# Patient Record
Sex: FEMALE | Race: BLACK OR AFRICAN AMERICAN | NOT HISPANIC OR LATINO | Employment: UNEMPLOYED | URBAN - METROPOLITAN AREA
[De-identification: names, ages, dates, MRNs, and addresses within clinical notes are randomized per-mention and may not be internally consistent; named-entity substitution may affect disease eponyms.]

---

## 2022-07-13 RX ORDER — LANCETS 33 GAUGE
EACH MISCELLANEOUS
Qty: 100 EACH | Refills: 4 | Status: SHIPPED | OUTPATIENT
Start: 2022-07-13

## 2022-07-13 RX ORDER — BLOOD-GLUCOSE METER
EACH MISCELLANEOUS
Qty: 100 STRIP | Refills: 6 | Status: SHIPPED | OUTPATIENT
Start: 2022-07-13 | End: 2023-12-09

## 2024-01-05 RX ORDER — TIZANIDINE 4 MG/1
TABLET ORAL
Qty: 30 TABLET | Refills: 0 | Status: SHIPPED | OUTPATIENT
Start: 2024-01-05 | End: 2024-01-22

## 2024-01-05 NOTE — TELEPHONE ENCOUNTER
----- Message from Caridad Padilla sent at 1/5/2024  9:40 AM CST -----  Type:  RX Refill Request    Who Called: pt   Refill or New Rx:refill  RX Name and Strength:tiZANidine (ZANAFLEX) 4 MG tablet    Preferred Pharmacy with phone number:[id number]  [place] [address]  Local or Mail Order:local  Ordering Provider:Ludy Le FNP  Would the patient rather a call back or a response via [facility]? Call back  Best Call Back Number:[phone]

## 2024-01-05 NOTE — TELEPHONE ENCOUNTER
Pt requesting refill for tiZANidine (ZANAFLEX) 4 MG tablet           LOV: 11/8/23      NOV: 1/8/24

## 2024-01-08 ENCOUNTER — TELEPHONE (OUTPATIENT)
Dept: INTERNAL MEDICINE | Facility: CLINIC | Age: 69
End: 2024-01-08
Payer: MEDICARE

## 2024-01-08 NOTE — TELEPHONE ENCOUNTER
----- Message from Ge Mahmood sent at 1/2/2024 10:57 AM CST -----  .Type:  RX Refill Request    Who Called: pt  Refill or New Rx:Refill  RX Name and Strength:tiZANidine (ZANAFLEX) 4 MG tablet  How is the patient currently taking it? (ex. 1XDay):Take 0.5 tablets (2 mg total) by mouth nightly. - Oral  Is this a 30 day or 90 day RX:30 day  Preferred Pharmacy with phone number:[id number] [address] [place] - [facility]  Local or Mail Order:local  Ordering Provider:  Would the patient rather a call back or a response via [facility]? Call back  Best Call Back Number:  Additional Information:

## 2024-01-19 ENCOUNTER — LAB VISIT (OUTPATIENT)
Dept: LAB | Facility: HOSPITAL | Age: 69
End: 2024-01-19
Attending: NURSE PRACTITIONER
Payer: MEDICARE

## 2024-01-19 DIAGNOSIS — R73.03 PREDIABETES: ICD-10-CM

## 2024-01-19 DIAGNOSIS — E55.9 VITAMIN D DEFICIENCY: ICD-10-CM

## 2024-01-19 DIAGNOSIS — I10 HYPERTENSION, UNSPECIFIED TYPE: ICD-10-CM

## 2024-01-19 DIAGNOSIS — E78.5 HYPERLIPIDEMIA, UNSPECIFIED HYPERLIPIDEMIA TYPE: ICD-10-CM

## 2024-01-19 LAB
ALBUMIN SERPL-MCNC: 4.1 G/DL (ref 3.4–4.8)
ALBUMIN/GLOB SERPL: 1.2 RATIO (ref 1.1–2)
ALP SERPL-CCNC: 100 UNIT/L (ref 40–150)
ALT SERPL-CCNC: 19 UNIT/L (ref 0–55)
AST SERPL-CCNC: 16 UNIT/L (ref 5–34)
BASOPHILS # BLD AUTO: 0.04 X10(3)/MCL
BASOPHILS NFR BLD AUTO: 0.5 %
BILIRUB SERPL-MCNC: 0.2 MG/DL
BUN SERPL-MCNC: 12.7 MG/DL (ref 9.8–20.1)
CALCIUM SERPL-MCNC: 9.5 MG/DL (ref 8.4–10.2)
CHLORIDE SERPL-SCNC: 110 MMOL/L (ref 98–107)
CHOLEST SERPL-MCNC: 220 MG/DL
CHOLEST/HDLC SERPL: 4 {RATIO} (ref 0–5)
CO2 SERPL-SCNC: 24 MMOL/L (ref 23–31)
CREAT SERPL-MCNC: 0.66 MG/DL (ref 0.55–1.02)
DEPRECATED CALCIDIOL+CALCIFEROL SERPL-MC: 36.5 NG/ML (ref 30–80)
EOSINOPHIL # BLD AUTO: 0.12 X10(3)/MCL (ref 0–0.9)
EOSINOPHIL NFR BLD AUTO: 1.6 %
ERYTHROCYTE [DISTWIDTH] IN BLOOD BY AUTOMATED COUNT: 14.5 % (ref 11.5–17)
EST. AVERAGE GLUCOSE BLD GHB EST-MCNC: 108.3 MG/DL
GFR SERPLBLD CREATININE-BSD FMLA CKD-EPI: >60 MLS/MIN/1.73/M2
GLOBULIN SER-MCNC: 3.5 GM/DL (ref 2.4–3.5)
GLUCOSE SERPL-MCNC: 105 MG/DL (ref 82–115)
HBA1C MFR BLD: 5.4 %
HCT VFR BLD AUTO: 38.8 % (ref 37–47)
HDLC SERPL-MCNC: 51 MG/DL (ref 35–60)
HGB BLD-MCNC: 13.7 G/DL (ref 12–16)
IMM GRANULOCYTES # BLD AUTO: 0.02 X10(3)/MCL (ref 0–0.04)
IMM GRANULOCYTES NFR BLD AUTO: 0.3 %
LDLC SERPL CALC-MCNC: 127 MG/DL (ref 50–140)
LYMPHOCYTES # BLD AUTO: 2.44 X10(3)/MCL (ref 0.6–4.6)
LYMPHOCYTES NFR BLD AUTO: 32.1 %
MCH RBC QN AUTO: 26.1 PG (ref 27–31)
MCHC RBC AUTO-ENTMCNC: 35.3 G/DL (ref 33–36)
MCV RBC AUTO: 74 FL (ref 80–94)
MONOCYTES # BLD AUTO: 0.47 X10(3)/MCL (ref 0.1–1.3)
MONOCYTES NFR BLD AUTO: 6.2 %
NEUTROPHILS # BLD AUTO: 4.52 X10(3)/MCL (ref 2.1–9.2)
NEUTROPHILS NFR BLD AUTO: 59.3 %
NRBC BLD AUTO-RTO: 0 %
PLATELET # BLD AUTO: 302 X10(3)/MCL (ref 130–400)
PMV BLD AUTO: 11 FL (ref 7.4–10.4)
POTASSIUM SERPL-SCNC: 3.8 MMOL/L (ref 3.5–5.1)
PROT SERPL-MCNC: 7.6 GM/DL (ref 5.8–7.6)
RBC # BLD AUTO: 5.24 X10(6)/MCL (ref 4.2–5.4)
SODIUM SERPL-SCNC: 144 MMOL/L (ref 136–145)
TRIGL SERPL-MCNC: 210 MG/DL (ref 37–140)
VLDLC SERPL CALC-MCNC: 42 MG/DL
WBC # SPEC AUTO: 7.61 X10(3)/MCL (ref 4.5–11.5)

## 2024-01-19 PROCEDURE — 85025 COMPLETE CBC W/AUTO DIFF WBC: CPT

## 2024-01-19 PROCEDURE — 36415 COLL VENOUS BLD VENIPUNCTURE: CPT

## 2024-01-19 PROCEDURE — 83036 HEMOGLOBIN GLYCOSYLATED A1C: CPT

## 2024-01-19 PROCEDURE — 80053 COMPREHEN METABOLIC PANEL: CPT

## 2024-01-19 PROCEDURE — 82306 VITAMIN D 25 HYDROXY: CPT

## 2024-01-19 PROCEDURE — 80061 LIPID PANEL: CPT

## 2024-01-22 ENCOUNTER — OFFICE VISIT (OUTPATIENT)
Dept: INTERNAL MEDICINE | Facility: CLINIC | Age: 69
End: 2024-01-22
Payer: MEDICARE

## 2024-01-22 VITALS
RESPIRATION RATE: 18 BRPM | WEIGHT: 164 LBS | SYSTOLIC BLOOD PRESSURE: 122 MMHG | BODY MASS INDEX: 30.18 KG/M2 | DIASTOLIC BLOOD PRESSURE: 67 MMHG | HEIGHT: 62 IN | TEMPERATURE: 98 F | HEART RATE: 66 BPM

## 2024-01-22 DIAGNOSIS — R73.03 PREDIABETES: ICD-10-CM

## 2024-01-22 DIAGNOSIS — E55.9 VITAMIN D DEFICIENCY: ICD-10-CM

## 2024-01-22 DIAGNOSIS — Z12.11 COLON CANCER SCREENING: ICD-10-CM

## 2024-01-22 DIAGNOSIS — E78.5 HYPERLIPIDEMIA, UNSPECIFIED HYPERLIPIDEMIA TYPE: ICD-10-CM

## 2024-01-22 DIAGNOSIS — I10 HYPERTENSION, UNSPECIFIED TYPE: ICD-10-CM

## 2024-01-22 PROCEDURE — 99213 OFFICE O/P EST LOW 20 MIN: CPT | Mod: PBBFAC | Performed by: NURSE PRACTITIONER

## 2024-01-22 PROCEDURE — 99214 OFFICE O/P EST MOD 30 MIN: CPT | Mod: S$PBB,,, | Performed by: NURSE PRACTITIONER

## 2024-01-22 PROCEDURE — 3074F SYST BP LT 130 MM HG: CPT | Mod: CPTII,,, | Performed by: NURSE PRACTITIONER

## 2024-01-22 PROCEDURE — 1159F MED LIST DOCD IN RCRD: CPT | Mod: CPTII,,, | Performed by: NURSE PRACTITIONER

## 2024-01-22 PROCEDURE — 3008F BODY MASS INDEX DOCD: CPT | Mod: CPTII,,, | Performed by: NURSE PRACTITIONER

## 2024-01-22 PROCEDURE — 1160F RVW MEDS BY RX/DR IN RCRD: CPT | Mod: CPTII,,, | Performed by: NURSE PRACTITIONER

## 2024-01-22 PROCEDURE — 1126F AMNT PAIN NOTED NONE PRSNT: CPT | Mod: CPTII,,, | Performed by: NURSE PRACTITIONER

## 2024-01-22 PROCEDURE — 3044F HG A1C LEVEL LT 7.0%: CPT | Mod: CPTII,,, | Performed by: NURSE PRACTITIONER

## 2024-01-22 PROCEDURE — 3078F DIAST BP <80 MM HG: CPT | Mod: CPTII,,, | Performed by: NURSE PRACTITIONER

## 2024-01-22 PROCEDURE — 1101F PT FALLS ASSESS-DOCD LE1/YR: CPT | Mod: CPTII,,, | Performed by: NURSE PRACTITIONER

## 2024-01-22 PROCEDURE — 3288F FALL RISK ASSESSMENT DOCD: CPT | Mod: CPTII,,, | Performed by: NURSE PRACTITIONER

## 2024-01-22 RX ORDER — MELOXICAM 7.5 MG/1
7.5 TABLET ORAL DAILY
Qty: 90 TABLET | Refills: 1 | Status: SHIPPED | OUTPATIENT
Start: 2024-01-22 | End: 2024-05-23 | Stop reason: SDUPTHER

## 2024-01-22 RX ORDER — HYDROCHLOROTHIAZIDE 25 MG/1
25 TABLET ORAL DAILY
Qty: 90 TABLET | Refills: 1 | Status: SHIPPED | OUTPATIENT
Start: 2024-01-22 | End: 2024-05-23 | Stop reason: SDUPTHER

## 2024-01-22 RX ORDER — EZETIMIBE 10 MG/1
10 TABLET ORAL DAILY
Qty: 90 TABLET | Refills: 1 | Status: SHIPPED | OUTPATIENT
Start: 2024-01-22 | End: 2024-05-23 | Stop reason: SDUPTHER

## 2024-01-22 RX ORDER — AMLODIPINE BESYLATE 10 MG/1
10 TABLET ORAL DAILY
Qty: 90 TABLET | Refills: 1 | Status: SHIPPED | OUTPATIENT
Start: 2024-01-22 | End: 2024-05-23 | Stop reason: SDUPTHER

## 2024-01-22 RX ORDER — TIZANIDINE 4 MG/1
4 TABLET ORAL NIGHTLY PRN
Qty: 30 TABLET | Refills: 3 | Status: SHIPPED | OUTPATIENT
Start: 2024-01-22 | End: 2024-05-23 | Stop reason: SDUPTHER

## 2024-01-22 RX ORDER — LEVOCETIRIZINE DIHYDROCHLORIDE 5 MG/1
5 TABLET, FILM COATED ORAL NIGHTLY
Qty: 90 TABLET | Refills: 1 | Status: SHIPPED | OUTPATIENT
Start: 2024-01-22 | End: 2024-05-23 | Stop reason: SDUPTHER

## 2024-01-22 RX ORDER — MUPIROCIN 20 MG/G
OINTMENT TOPICAL 2 TIMES DAILY
Qty: 30 G | Refills: 1 | Status: SHIPPED | OUTPATIENT
Start: 2024-01-22

## 2024-01-22 RX ORDER — CLOTRIMAZOLE AND BETAMETHASONE DIPROPIONATE 10; .64 MG/G; MG/G
CREAM TOPICAL 2 TIMES DAILY
Qty: 45 G | Refills: 1 | Status: SHIPPED | OUTPATIENT
Start: 2024-01-22

## 2024-01-22 RX ORDER — POTASSIUM CHLORIDE 20 MEQ/15ML
20 SOLUTION ORAL 2 TIMES DAILY
Qty: 900 ML | Refills: 6 | Status: SHIPPED | OUTPATIENT
Start: 2024-01-22 | End: 2024-05-23

## 2024-01-22 RX ORDER — PREGABALIN 150 MG/1
150 CAPSULE ORAL 2 TIMES DAILY
COMMUNITY
Start: 2023-11-27

## 2024-01-22 NOTE — PROGRESS NOTES
[facility]  KARY Merrill     Patient Name: Lucila Sharma   : 1955  MRN:[id number]     Chief Complaint     Chief Complaint   Patient presents with    Follow-up     Lab review        History of Present Illness     68 year old AAF, presents in clinic for lab f/u. No acute complaints.      She was following Dr. Sarmad solis for chronic neck and back pain, states he is retiring. Will manage tizanidine 4mg qhs and mobic 7.5 daily. She is aware I can not prescribe Lyrica. Request refill for cream for a itchy rash to her back.     PMH HTN, HLD, prediabetes, hypokalemia, AR, vit d def, right thyroid nodule, osteopenia. Pt is on Norvasc 5 qd, and HCTZ 25mg qd. Takes KDUR 20 bid. Pt is followed by mental health Dr. Zuleta, once every 3 months.  Patient is currently not in any counseling.  Following ENT; US guided thyroid nodule biopsy 20; benign; annual thyroid ultrasound ordered. TFT normal. Tells me she stopped her statin, Lipitor, several weeks ago due to leg pain/aches. States within 5 days of stopping statin the pain went away. Denies chest pain, shortness of breath, cough, fever, headache, dizziness, weakness, abdominal pain, nausea, vomiting, diarrhea, constipation, dysuria, depression, SI, HI, hallucinations.     MMG 3/28/2023; BIRADS 1  Cologuard ordered but sample collected could not be processed x2. Will order FIT. She is not ready to have colonoscopy performed.      Thyroid US: 10/11/2022;  Stable multinodular goiter; ENT clinic advised to repeat US in 2 yrs (due 10/2024)               Review of Systems     Review of Systems   Constitutional: Negative.    HENT: Negative.     Eyes: Negative.    Respiratory: Negative.     Cardiovascular: Negative.    Gastrointestinal: Negative.    Endocrine: Negative.    Genitourinary: Negative.    Musculoskeletal: Negative.    Integumentary:  Negative.   Allergic/Immunologic: Negative.    Neurological: Negative.    Hematological: Negative.   "  Psychiatric/Behavioral: Negative.     All other systems reviewed and are negative.       Physical Examination     Visit Vitals  /67 (BP Location: Left arm, Patient Position: Sitting, BP Method: Medium (Automatic))   Pulse 66   Temp 97.8 °F (36.6 °C) (Oral)   Resp 18   Ht 5' 2" (1.575 m)   Wt 74.4 kg (164 lb)   BMI 30.00 kg/m²        BP Readings from Last 6 Encounters:   01/22/24 122/67   10/02/23 126/70   09/11/23 122/67   09/06/23 (!) 157/100   09/03/23 (!) 141/89   08/24/23 (!) 148/70   ]    Wt Readings from Last 6 Encounters:   01/22/24 74.4 kg (164 lb)   10/02/23 72.6 kg (160 lb)   09/11/23 72.1 kg (159 lb)   09/06/23 73.5 kg (162 lb)   09/02/23 73.6 kg (162 lb 5.9 oz)   08/24/23 72.1 kg (159 lb)   ]      Physical Exam  Vitals and nursing note reviewed.   Constitutional:       Appearance: Normal appearance.   HENT:      Head: Normocephalic and atraumatic.      Right Ear: Tympanic membrane, ear canal and external ear normal.      Left Ear: Tympanic membrane, ear canal and external ear normal.      Nose: Nose normal.      Mouth/Throat:      Mouth: Mucous membranes are moist.      Pharynx: Oropharynx is clear.   Eyes:      Extraocular Movements: Extraocular movements intact.      Conjunctiva/sclera: Conjunctivae normal.      Pupils: Pupils are equal, round, and reactive to light.   Cardiovascular:      Rate and Rhythm: Normal rate and regular rhythm.      Pulses: Normal pulses.      Heart sounds: Normal heart sounds.   Pulmonary:      Effort: Pulmonary effort is normal.      Breath sounds: Normal breath sounds.   Abdominal:      General: Abdomen is flat. Bowel sounds are normal.      Palpations: Abdomen is soft.   Musculoskeletal:         General: Normal range of motion.      Cervical back: Normal range of motion and neck supple.   Skin:     General: Skin is warm and dry.      Capillary Refill: Capillary refill takes less than 2 seconds.   Neurological:      General: No focal deficit present.      Mental " Status: She is alert and oriented to person, place, and time. Mental status is at baseline.   Psychiatric:         Mood and Affect: Mood normal.         Behavior: Behavior normal.         Thought Content: Thought content normal.         Judgment: Judgment normal.          Labs / Imaging     Chemistry:  Lab Results   Component Value Date     01/19/2024    K 3.8 01/19/2024    CHLORIDE 110 (H) 01/19/2024    BUN 12.7 01/19/2024    CREATININE 0.66 01/19/2024    EGFRNORACEVR >60 01/19/2024    GLUCOSE 105 01/19/2024    CALCIUM 9.5 01/19/2024    ALKPHOS 100 01/19/2024    LABPROT 7.6 01/19/2024    ALBUMIN 4.1 01/19/2024    BILIDIR 0.2 03/15/2022    IBILI 0.30 03/15/2022    AST 16 01/19/2024    ALT 19 01/19/2024    MG 1.90 09/06/2023    RDONQEIM00AN 36.5 01/19/2024        Lab Results   Component Value Date    HGBA1C 5.4 01/19/2024        Hematology:  Lab Results   Component Value Date    WBC 7.61 01/19/2024    RBC 5.24 01/19/2024    HGB 13.7 01/19/2024    HCT 38.8 01/19/2024    MCV 74.0 (L) 01/19/2024    MCH 26.1 (L) 01/19/2024    MCHC 35.3 01/19/2024    RDW 14.5 01/19/2024     01/19/2024    MPV 11.0 (H) 01/19/2024        Lipid Panel:  Lab Results   Component Value Date    CHOL 220 (H) 01/19/2024    HDL 51 01/19/2024    .00 01/19/2024    TRIG 210 (H) 01/19/2024    TOTALCHOLEST 4 01/19/2024        Urine:  Lab Results   Component Value Date    COLORUA Light-Yellow 01/19/2024    APPEARANCEUA Clear 01/19/2024    SGUA 1.018 01/19/2024    PHUA 5.0 01/19/2024    PROTEINUA Negative 01/19/2024    GLUCOSEUA Normal 01/19/2024    KETONESUA 1+ (A) 01/19/2024    BLOODUA Negative 01/19/2024    NITRITESUA Negative 01/19/2024    LEUKOCYTESUR Negative 01/19/2024    RBCUA 0-5 01/19/2024    WBCUA 0-5 01/19/2024    BACTERIA None Seen 01/19/2024    SQEPUA Trace (A) 01/19/2024    HYALINECASTS None Seen 01/19/2024          Assessment       ICD-10-CM ICD-9-CM   1. Hypertension, unspecified type  I10 401.9   2. Hyperlipidemia,  unspecified hyperlipidemia type  E78.5 272.4   3. Prediabetes  R73.03 790.29   4. Vitamin D deficiency  E55.9 268.9   5. Colon cancer screening  Z12.11 V76.51   6. BMI 30.0-30.9,adult  Z68.30 V85.30        Plan   1. Hypertension, unspecified type  BP and HR stable. Med refills. DASH diet: Eat more fruits, vegetables, and low fat dairy foods.  (Less than 2 grams of sodium per day).  Maintain healthy weight with goal BMI <30.   Exercise 30 minutes per day 5 days per week.  Home medications refilled and continued.   Home BP monitoring encouraged with BP parameters given.    - CBC Auto Differential; Future  - Comprehensive Metabolic Panel; Future  - Lipid Panel; Future  - Hemoglobin A1C; Future    2. Hyperlipidemia, unspecified hyperlipidemia type  Lab Results   Component Value Date    .00 01/19/2024    CHOL 220 (H) 01/19/2024    HDL 51 01/19/2024    TRIG 210 (H) 01/19/2024       Cont RX daily; refills given. Take Omega 3 daily.   Stressed importance of dietary modifications. Follow a low cholesterol, low saturated fat diet with less that 200mg of cholesterol a day.  Avoid fried foods and high saturated fats (high saturated fats less than 7% of calories).  Add Flax Seed/Fish Oil supplements to diet. Increase dietary fiber.  Regular exercise can reduce LDL and raise HDL. Stressed importance of physical activity 5 times per week for 30 minutes per day.    - CBC Auto Differential; Future  - Comprehensive Metabolic Panel; Future  - Lipid Panel; Future  - Hemoglobin A1C; Future    3. Prediabetes  A1C 5.4  Prediabetes is reversible. Close follow up is important.  Maintain healthy weight or lose weight.   Eat fewer refined carbohydrates and fats, and more fiber.  Read nutrition labels.  Reduce portion sizes.  Eat out less often. Avoid fast foods.  Drink water and unsweetened beverages.  Spending at least one hour every day in physical activity.   - CBC Auto Differential; Future  - Comprehensive Metabolic Panel;  Future  - Lipid Panel; Future  - Hemoglobin A1C; Future    4. Vitamin D deficiency  Vitamin D level reviewed and is currently at goal, between 30-80 ng/mL. Continue OTC Vitamin D3 2000 IU daily.   - Vitamin D; Future    5. Colon cancer screening    - OCCULT BLOOD FECAL IMMUNOASSAY; Future  - OCCULT BLOOD FECAL IMMUNOASSAY    6. BMI 30.0-30.9,adult  Goal BMI <30.  Exercise 5 times a week for 30 minutes per day.  Avoid soda, simple sugars, excessive rice, potatoes or bread. Limit fast foods and fried foods.  Choose complex carbs in moderation (example: green vegetables, beans, oatmeal). Eat plenty of fresh fruits and vegetables with lean meats daily.  Do not skip meals. Eat a balanced portion size.  Avoid fad diets. Consider permanent healthy life style changes.           Current Outpatient Medications   Medication Instructions    amLODIPine (NORVASC) 10 mg, Oral, Daily    azelastine (ASTELIN) 137 mcg, Nasal, 2 times daily    blood sugar diagnostic (ONETOUCH VERIO TEST STRIPS) Strp 1 each, Misc.(Non-Drug; Combo Route), Daily, DX: E11.9 Use daily to check CBG    blood sugar diagnostic Strp 1 each, Misc.(Non-Drug; Combo Route), Daily, TRUE METRIX    blood sugar diagnostic Strp One touch Verio  test strips, See Instructions, Use dailyto check glucose  DX :DM ,code:E11.9, # 100 EA, 0 Refill(s), Pharmacy: [id number], 155, cm, Height/Length Dosing, 10/20/21 15:35:00 CDT, 73, kg, Weight Dosing, 10/20/21 15:35:00 CDT    clotrimazole-betamethasone 1-0.05% (LOTRISONE) cream Topical (Top), 2 times daily PRN    clotrimazole-betamethasone 1-0.05% (LOTRISONE) cream Topical (Top), 2 times daily    EScitalopram oxalate (LEXAPRO) 20 mg, Oral, Daily    ezetimibe (ZETIA) 10 mg, Oral, Daily    fluticasone propionate (FLONASE) 50 mcg, Each Nostril, Daily    hydroCHLOROthiazide (HYDRODIURIL) 25 mg, Oral, Daily    hydrOXYzine HCL (ATARAX) 25 mg, Oral, 3 times daily PRN    lancets (ONETOUCH DELICA PLUS LANCET) 33 gauge Misc 1  lancet , Misc.(Non-Drug; Combo Route), Daily    lancets 33 gauge Misc Misc.(Non-Drug; Combo Route)    lancets Misc   One Touch Delica Lancets 33g, See Instructions, Use daily to check glucose  Dx DM: code:E11.9, # 100 EA, 0 Refill(s), Pharmacy: [id number], 155, cm, Height/Length Dosing, 10/20/21 15:35:00 CDT, 73, kg, Weight Dosing, 10/20/21 15:35:00 CDT    LATUDA 40 mg Tab tablet 1 tablet, Oral, Daily    levocetirizine (XYZAL) 5 mg, Oral, Nightly    meloxicam (MOBIC) 7.5 mg, Oral, Daily    mirtazapine (REMERON) 30 mg, Oral, Nightly    mupirocin (BACTROBAN) 2 % ointment Topical (Top), 2 times daily    ONETOUCH DELICA PLUS LANCET 33 gauge Misc USE 1 LANCET TO CHECK GLUCOSE ONCE DAILY    potassium chloride 10% (KAYCIEL) 20 mEq/15 mL oral solution 20 mEq, Oral, 2 times daily    prazosin (MINIPRESS) 5 mg, Oral, Nightly    pregabalin (LYRICA) 150 mg, Oral, 2 times daily    pregabalin (LYRICA) 150 mg, Oral, 2 times daily    tiZANidine (ZANAFLEX) 4 mg, Oral, Nightly PRN    traMADoL (ULTRAM) 50 mg, Oral, 2 times daily PRN    traZODone (DESYREL) 100 mg, Oral, Nightly    triamcinolone acetonide 0.1% (KENALOG) 0.1 % cream Topical (Top), 3 times daily PRN    triamcinolone acetonide 0.1% (KENALOG) 0.1 % ointment   See Instructions, TOP TID to affected area prn rash, # 60 gm, 1 Refill(s), Pharmacy: [id number], 155, cm, Height/Length Dosing, 10/20/21 13:35:00 CDT, 73.3, kg, Weight Dosing, 10/20/21 13:35:00 CDT       Orders Placed This Encounter   Procedures    OCCULT BLOOD FECAL IMMUNOASSAY    CBC Auto Differential    Comprehensive Metabolic Panel    Lipid Panel    Hemoglobin A1C    Vitamin D         Future Appointments   Date Time Provider Department Center   5/23/2024  1:00 PM Ludy Le, FNP [facility]   5/30/2024  1:30 PM Janey Prather, ANP [facility]        Follow up in about 4 months (around 5/22/2024) for lab review.    Labs thoroughly reviewed with patient. Medication  refills addressed today.  RTC prn and 4 months, with labs 1 week prior to the apt.  COVID 19 precautions given to patient.  Patient voices understanding of all discharge instructions.      KARY Merrill

## 2024-04-04 ENCOUNTER — HOSPITAL ENCOUNTER (OUTPATIENT)
Dept: RADIOLOGY | Facility: HOSPITAL | Age: 69
Discharge: HOME | End: 2024-04-04
Attending: NURSE PRACTITIONER
Payer: MEDICARE

## 2024-04-04 DIAGNOSIS — Z12.31 BREAST CANCER SCREENING BY MAMMOGRAM: ICD-10-CM

## 2024-04-04 PROCEDURE — 77063 BREAST TOMOSYNTHESIS BI: CPT | Mod: 26,,, | Performed by: RADIOLOGY

## 2024-04-04 PROCEDURE — 77067 SCR MAMMO BI INCL CAD: CPT | Mod: 26,,, | Performed by: RADIOLOGY

## 2024-04-04 PROCEDURE — 77067 SCR MAMMO BI INCL CAD: CPT | Mod: TC

## 2024-05-23 ENCOUNTER — OFFICE VISIT (OUTPATIENT)
Dept: INTERNAL MEDICINE | Facility: CLINIC | Age: 69
End: 2024-05-23
Payer: MEDICARE

## 2024-05-23 ENCOUNTER — HOSPITAL ENCOUNTER (OUTPATIENT)
Dept: RADIOLOGY | Facility: HOSPITAL | Age: 69
Discharge: HOME | End: 2024-05-23
Attending: NURSE PRACTITIONER
Payer: MEDICARE

## 2024-05-23 VITALS
DIASTOLIC BLOOD PRESSURE: 60 MMHG | HEIGHT: 62 IN | HEART RATE: 69 BPM | TEMPERATURE: 98 F | WEIGHT: 162.06 LBS | RESPIRATION RATE: 16 BRPM | BODY MASS INDEX: 29.82 KG/M2 | SYSTOLIC BLOOD PRESSURE: 103 MMHG

## 2024-05-23 DIAGNOSIS — R60.9 EDEMA, UNSPECIFIED TYPE: ICD-10-CM

## 2024-05-23 DIAGNOSIS — E78.5 HYPERLIPIDEMIA, UNSPECIFIED HYPERLIPIDEMIA TYPE: ICD-10-CM

## 2024-05-23 DIAGNOSIS — E55.9 VITAMIN D DEFICIENCY: ICD-10-CM

## 2024-05-23 DIAGNOSIS — I10 HYPERTENSION, UNSPECIFIED TYPE: ICD-10-CM

## 2024-05-23 DIAGNOSIS — R73.03 PREDIABETES: ICD-10-CM

## 2024-05-23 DIAGNOSIS — M54.2 CHRONIC NECK AND BACK PAIN: ICD-10-CM

## 2024-05-23 DIAGNOSIS — M54.9 CHRONIC NECK AND BACK PAIN: ICD-10-CM

## 2024-05-23 DIAGNOSIS — G89.29 CHRONIC NECK AND BACK PAIN: ICD-10-CM

## 2024-05-23 PROCEDURE — 3008F BODY MASS INDEX DOCD: CPT | Mod: CPTII,,, | Performed by: NURSE PRACTITIONER

## 2024-05-23 PROCEDURE — 3074F SYST BP LT 130 MM HG: CPT | Mod: CPTII,,, | Performed by: NURSE PRACTITIONER

## 2024-05-23 PROCEDURE — 1101F PT FALLS ASSESS-DOCD LE1/YR: CPT | Mod: CPTII,,, | Performed by: NURSE PRACTITIONER

## 2024-05-23 PROCEDURE — 3288F FALL RISK ASSESSMENT DOCD: CPT | Mod: CPTII,,, | Performed by: NURSE PRACTITIONER

## 2024-05-23 PROCEDURE — 99215 OFFICE O/P EST HI 40 MIN: CPT | Mod: PBBFAC | Performed by: NURSE PRACTITIONER

## 2024-05-23 PROCEDURE — 99214 OFFICE O/P EST MOD 30 MIN: CPT | Mod: S$PBB,,, | Performed by: NURSE PRACTITIONER

## 2024-05-23 PROCEDURE — 1160F RVW MEDS BY RX/DR IN RCRD: CPT | Mod: CPTII,,, | Performed by: NURSE PRACTITIONER

## 2024-05-23 PROCEDURE — 1125F AMNT PAIN NOTED PAIN PRSNT: CPT | Mod: CPTII,,, | Performed by: NURSE PRACTITIONER

## 2024-05-23 PROCEDURE — 3078F DIAST BP <80 MM HG: CPT | Mod: CPTII,,, | Performed by: NURSE PRACTITIONER

## 2024-05-23 PROCEDURE — 1159F MED LIST DOCD IN RCRD: CPT | Mod: CPTII,,, | Performed by: NURSE PRACTITIONER

## 2024-05-23 PROCEDURE — 71046 X-RAY EXAM CHEST 2 VIEWS: CPT | Mod: TC

## 2024-05-23 PROCEDURE — 3044F HG A1C LEVEL LT 7.0%: CPT | Mod: CPTII,,, | Performed by: NURSE PRACTITIONER

## 2024-05-23 RX ORDER — AZELASTINE 1 MG/ML
1 SPRAY, METERED NASAL 2 TIMES DAILY
Qty: 30 ML | Refills: 5 | Status: SHIPPED | OUTPATIENT
Start: 2024-05-23

## 2024-05-23 RX ORDER — LEVOCETIRIZINE DIHYDROCHLORIDE 5 MG/1
5 TABLET, FILM COATED ORAL NIGHTLY
Qty: 90 TABLET | Refills: 1 | Status: SHIPPED | OUTPATIENT
Start: 2024-05-23 | End: 2025-05-23

## 2024-05-23 RX ORDER — TIZANIDINE 4 MG/1
4 TABLET ORAL NIGHTLY PRN
Qty: 45 TABLET | Refills: 3 | Status: SHIPPED | OUTPATIENT
Start: 2024-05-23

## 2024-05-23 RX ORDER — POTASSIUM CHLORIDE 20 MEQ/1
20 TABLET, EXTENDED RELEASE ORAL 2 TIMES DAILY
Qty: 180 TABLET | Refills: 1 | Status: SHIPPED | OUTPATIENT
Start: 2024-05-23

## 2024-05-23 RX ORDER — MELOXICAM 7.5 MG/1
7.5 TABLET ORAL DAILY
Qty: 90 TABLET | Refills: 1 | Status: SHIPPED | OUTPATIENT
Start: 2024-05-23

## 2024-05-23 RX ORDER — AMLODIPINE BESYLATE 10 MG/1
10 TABLET ORAL DAILY
Qty: 90 TABLET | Refills: 1 | Status: SHIPPED | OUTPATIENT
Start: 2024-05-23

## 2024-05-23 RX ORDER — EZETIMIBE 10 MG/1
10 TABLET ORAL DAILY
Qty: 90 TABLET | Refills: 1 | Status: SHIPPED | OUTPATIENT
Start: 2024-05-23 | End: 2025-05-23

## 2024-05-23 RX ORDER — FLUTICASONE PROPIONATE 50 MCG
1 SPRAY, SUSPENSION (ML) NASAL DAILY
Qty: 16 G | Refills: 5 | Status: SHIPPED | OUTPATIENT
Start: 2024-05-23

## 2024-05-23 RX ORDER — HYDROCHLOROTHIAZIDE 25 MG/1
25 TABLET ORAL DAILY
Qty: 90 TABLET | Refills: 1 | Status: SHIPPED | OUTPATIENT
Start: 2024-05-23 | End: 2025-05-23

## 2024-05-23 NOTE — PROGRESS NOTES
[facility]  KARY Merrill     Patient Name: Lucila Sharma   : 1955  MRN:[id number]     Chief Complaint     Chief Complaint   Patient presents with    Follow-up     Lab review,request pain med        History of Present Illness     68 year old AAF, presents in clinic for lab f/u. Complains of adriane lower ext swelling in the evenings. K low at 3.3. She is on HCTZ 25 daily. She would like to switch back to pill form of KDUR d/t trouble drawing up medication. Would like referral to PM.      She was following Dr. Sarmad solis for chronic neck and back pain, retiring. Will manage tizanidine 4mg qhs and mobic 7.5 daily. She is aware I can not prescribe Lyrica or tramadol.     PMH HTN, HLD, prediabetes, hypokalemia, AR, vit d def, right thyroid nodule, osteopenia. Pt is on Norvasc 5 qd, and HCTZ 25mg qd. Takes KDUR 20 bid. Pt is followed by mental health Dr. Zuleta, once every 3 months.  Patient is currently not in any counseling.  Following ENT; US guided thyroid nodule biopsy 20; benign; annual thyroid ultrasound ordered. TFT normal. Tells me she stopped her statin, Lipitor, several weeks ago due to leg pain/aches. States within 5 days of stopping statin the pain went away. Denies chest pain, shortness of breath, cough, fever, headache, dizziness, weakness, abdominal pain, nausea, vomiting, diarrhea, constipation, dysuria, depression, SI, HI, hallucinations.     MMG 3/28/2023; BIRADS 1  Cologuard ordered but sample collected could not be processed x2. Will order FIT. She is not ready to have colonoscopy performed.      Thyroid US: 10/11/2022;  Stable multinodular goiter; ENT clinic advised to repeat US in 2 yrs (due 10/2024)               Review of Systems     Review of Systems   Constitutional: Negative.    HENT: Negative.     Eyes: Negative.    Respiratory: Negative.     Cardiovascular: Negative.    Gastrointestinal: Negative.    Endocrine: Negative.    Genitourinary: Negative.   "  Musculoskeletal: Negative.    Integumentary:  Negative.   Allergic/Immunologic: Negative.    Neurological: Negative.    Hematological: Negative.    Psychiatric/Behavioral: Negative.     All other systems reviewed and are negative.       Physical Examination     Visit Vitals  /60 (BP Location: Left arm, Patient Position: Sitting, BP Method: Medium (Automatic))   Pulse 69   Temp 97.9 °F (36.6 °C) (Oral)   Resp 16   Ht 5' 2" (1.575 m)   Wt 73.5 kg (162 lb 0.6 oz)   BMI 29.64 kg/m²        BP Readings from Last 6 Encounters:   05/23/24 103/60   01/22/24 122/67   10/02/23 126/70   09/11/23 122/67   09/06/23 (!) 157/100   09/03/23 (!) 141/89   ]    Wt Readings from Last 6 Encounters:   05/23/24 73.5 kg (162 lb 0.6 oz)   01/22/24 74.4 kg (164 lb)   10/02/23 72.6 kg (160 lb)   09/11/23 72.1 kg (159 lb)   09/06/23 73.5 kg (162 lb)   09/02/23 73.6 kg (162 lb 5.9 oz)   ]    BMI Readings from Last 3 Encounters:   01/22/24 30.00 kg/m²   10/02/23 29.26 kg/m²   09/11/23 29.08 kg/m²         Physical Exam  Vitals and nursing note reviewed.   Constitutional:       Appearance: Normal appearance.   HENT:      Head: Normocephalic and atraumatic.      Right Ear: Tympanic membrane, ear canal and external ear normal.      Left Ear: Tympanic membrane, ear canal and external ear normal.      Nose: Nose normal.      Mouth/Throat:      Mouth: Mucous membranes are moist.      Pharynx: Oropharynx is clear.   Eyes:      Extraocular Movements: Extraocular movements intact.      Conjunctiva/sclera: Conjunctivae normal.      Pupils: Pupils are equal, round, and reactive to light.   Cardiovascular:      Rate and Rhythm: Normal rate and regular rhythm.      Pulses: Normal pulses.      Heart sounds: Normal heart sounds.   Pulmonary:      Effort: Pulmonary effort is normal.      Breath sounds: Normal breath sounds.   Abdominal:      General: Abdomen is flat. Bowel sounds are normal.      Palpations: Abdomen is soft.   Musculoskeletal:         " General: Normal range of motion.      Cervical back: Normal range of motion and neck supple.   Skin:     General: Skin is warm and dry.      Capillary Refill: Capillary refill takes less than 2 seconds.   Neurological:      General: No focal deficit present.      Mental Status: She is alert and oriented to person, place, and time. Mental status is at baseline.   Psychiatric:         Mood and Affect: Mood normal.         Behavior: Behavior normal.         Thought Content: Thought content normal.         Judgment: Judgment normal.          Labs / Imaging     Chemistry:  Lab Results   Component Value Date     05/23/2024    K 3.3 (L) 05/23/2024    CHLORIDE 110 (H) 05/23/2024    BUN 14.1 05/23/2024    CREATININE 0.65 05/23/2024    EGFRNORACEVR >60 05/23/2024    GLUCOSE 118 (H) 05/23/2024    CALCIUM 9.1 05/23/2024    ALKPHOS 87 05/23/2024    LABPROT 6.7 05/23/2024    ALBUMIN 3.5 05/23/2024    BILIDIR 0.2 03/15/2022    IBILI 0.30 03/15/2022    AST 14 05/23/2024    ALT 15 05/23/2024    MG 1.90 09/06/2023    DDCJTLPI28TF 32 05/23/2024        Lab Results   Component Value Date    HGBA1C 5.5 05/23/2024        Hematology:  Lab Results   Component Value Date    WBC 6.62 05/23/2024    RBC 4.88 05/23/2024    HGB 12.5 05/23/2024    HCT 35.7 (L) 05/23/2024    MCV 73.2 (L) 05/23/2024    MCH 25.6 (L) 05/23/2024    MCHC 35.0 05/23/2024    RDW 14.6 05/23/2024     05/23/2024    MPV 11.0 (H) 05/23/2024        Lipid Panel:  Lab Results   Component Value Date    CHOL 209 (H) 05/23/2024    HDL 49 05/23/2024    .00 05/23/2024    TRIG 165 (H) 05/23/2024    TOTALCHOLEST 4 05/23/2024        Urine:  Lab Results   Component Value Date    COLORUA Light-Yellow 01/19/2024    APPEARANCEUA Clear 01/19/2024    SGUA 1.018 01/19/2024    PHUA 5.0 01/19/2024    PROTEINUA Negative 01/19/2024    GLUCOSEUA Normal 01/19/2024    KETONESUA 1+ (A) 01/19/2024    BLOODUA Negative 01/19/2024    NITRITESUA Negative 01/19/2024    LEUKOCYTESUR  Negative 01/19/2024    RBCUA 0-5 01/19/2024    WBCUA 0-5 01/19/2024    BACTERIA None Seen 01/19/2024    SQEPUA Trace (A) 01/19/2024    HYALINECASTS None Seen 01/19/2024          Assessment       ICD-10-CM ICD-9-CM   1. Hypertension, unspecified type  I10 401.9   2. Hyperlipidemia, unspecified hyperlipidemia type  E78.5 272.4   3. Edema, unspecified type  R60.9 782.3   4. Prediabetes  R73.03 790.29   5. Chronic neck and back pain  M54.2 723.1    M54.9 724.5    G89.29 338.29   6. Vitamin D deficiency  E55.9 268.9   7. BMI 29.0-29.9,adult  Z68.29 V85.25        Plan     1. Hypertension, unspecified type  BP and HR stable. Med refills. Switch to KDUR 20 bid. K 3.3. Increase k rich foods. DASH diet: Eat more fruits, vegetables, and low fat dairy foods.  (Less than 2 grams of sodium per day).  Maintain healthy weight with goal BMI <30.   Exercise 30 minutes per day 5 days per week.  Home medications refilled and continued.   Home BP monitoring encouraged with BP parameters given.    - X-Ray Chest PA And Lateral; Future  - Echo; Future    2. Hyperlipidemia, unspecified hyperlipidemia type  Lab Results   Component Value Date    .00 05/23/2024    CHOL 209 (H) 05/23/2024    HDL 49 05/23/2024    TRIG 165 (H) 05/23/2024       Cont RX daily; refills given. Take Omega 3 daily.   Stressed importance of dietary modifications. Follow a low cholesterol, low saturated fat diet with less that 200mg of cholesterol a day.  Avoid fried foods and high saturated fats (high saturated fats less than 7% of calories).  Add Flax Seed/Fish Oil supplements to diet. Increase dietary fiber.  Regular exercise can reduce LDL and raise HDL. Stressed importance of physical activity 5 times per week for 30 minutes per day.      3. Edema, unspecified type  ED precautions for acute CP, SOB, fever, palpitations  - X-Ray Chest PA And Lateral; Future  - Echo; Future    4. Prediabetes  A1C 5.5  Prediabetes is reversible. Close follow up is  important.  Maintain healthy weight or lose weight.   Eat fewer refined carbohydrates and fats, and more fiber.  Read nutrition labels.  Reduce portion sizes.  Eat out less often. Avoid fast foods.  Drink water and unsweetened beverages.  Spending at least one hour every day in physical activity.     5. Chronic neck and back pain  Referral to [facility]  Tizanidine refilled, increase to #45 per month, take prn spasm  - Ambulatory referral/consult to Pain Clinic; Future    6. Vitamin D deficiency  Vitamin D level reviewed and is currently at goal, between 30-80 ng/mL. Continue OTC Vitamin D3 2000 IU daily.     7. BMI 29.0-29.9,adult  Goal BMI <30.  Exercise 5 times a week for 30 minutes per day.  Avoid soda, simple sugars, excessive rice, potatoes or bread. Limit fast foods and fried foods.  Choose complex carbs in moderation (example: green vegetables, beans, oatmeal). Eat plenty of fresh fruits and vegetables with lean meats daily.  Do not skip meals. Eat a balanced portion size.  Avoid fad diets. Consider permanent healthy life style changes.          Current Outpatient Medications   Medication Instructions    amLODIPine (NORVASC) 10 mg, Oral, Daily    azelastine (ASTELIN) 137 mcg, Nasal, 2 times daily    blood sugar diagnostic (ONETOUCH VERIO TEST STRIPS) Strp 1 each, Misc.(Non-Drug; Combo Route), Daily, DX: E11.9 Use daily to check CBG    blood sugar diagnostic Strp 1 each, Misc.(Non-Drug; Combo Route), Daily, TRUE METRIX    blood sugar diagnostic Strp One touch Verio  test strips, See Instructions, Use dailyto check glucose  DX :DM ,code:E11.9, # 100 EA, 0 Refill(s), Pharmacy: [id number], 155, cm, Height/Length Dosing, 10/20/21 15:35:00 CDT, 73, kg, Weight Dosing, 10/20/21 15:35:00 CDT    clotrimazole-betamethasone 1-0.05% (LOTRISONE) cream Topical (Top), 2 times daily PRN    clotrimazole-betamethasone 1-0.05% (LOTRISONE) cream Topical (Top), 2 times daily    EScitalopram oxalate (LEXAPRO) 20 mg, Oral,  Daily    ezetimibe (ZETIA) 10 mg, Oral, Daily    fluticasone propionate (FLONASE) 50 mcg, Each Nostril, Daily    hydroCHLOROthiazide (HYDRODIURIL) 25 mg, Oral, Daily    hydrOXYzine HCL (ATARAX) 25 mg, Oral, 3 times daily PRN    lancets (ONETOUCH DELICA PLUS LANCET) 33 gauge Misc 1 lancet , Misc.(Non-Drug; Combo Route), Daily    lancets 33 gauge Misc Misc.(Non-Drug; Combo Route)    lancets Misc   One Touch Delica Lancets 33g, See Instructions, Use daily to check glucose  Dx DM: code:E11.9, # 100 EA, 0 Refill(s), Pharmacy: [id number], 155, cm, Height/Length Dosing, 10/20/21 15:35:00 CDT, 73, kg, Weight Dosing, 10/20/21 15:35:00 CDT    LATUDA 40 mg Tab tablet 1 tablet, Oral, Daily    levocetirizine (XYZAL) 5 mg, Oral, Nightly    magnesium glycinate 100 mg Tab 1 tablet, Oral, Once    meloxicam (MOBIC) 7.5 mg, Oral, Daily    mirtazapine (REMERON) 30 mg, Oral, Nightly    mupirocin (BACTROBAN) 2 % ointment Topical (Top), 2 times daily    ONETOUCH DELICA PLUS LANCET 33 gauge Misc USE 1 LANCET TO CHECK GLUCOSE ONCE DAILY    potassium chloride SA (K-DUR,KLOR-CON) 20 MEQ tablet 20 mEq, Oral, 2 times daily    prazosin (MINIPRESS) 5 mg, Oral, Nightly    pregabalin (LYRICA) 150 mg, 2 times daily    pregabalin (LYRICA) 150 mg, Oral, 2 times daily    tiZANidine (ZANAFLEX) 4 mg, Oral, Nightly PRN    traMADoL (ULTRAM) 50 mg, 2 times daily PRN    traZODone (DESYREL) 100 mg, Oral, Nightly    triamcinolone acetonide 0.1% (KENALOG) 0.1 % cream Topical (Top), 3 times daily PRN    triamcinolone acetonide 0.1% (KENALOG) 0.1 % ointment   See Instructions, TOP TID to affected area prn rash, # 60 gm, 1 Refill(s), Pharmacy: [id number], 155, cm, Height/Length Dosing, 10/20/21 13:35:00 CDT, 73.3, kg, Weight Dosing, 10/20/21 13:35:00 CDT       Orders Placed This Encounter   Procedures    X-Ray Chest PA And Lateral    CBC Auto Differential    Comprehensive Metabolic Panel    Lipid Panel    Hemoglobin A1C    Urinalysis    TSH     Ambulatory referral/consult to Pain Clinic    Echo         Future Appointments   Date Time Provider Department Center   5/23/2024  2:35 PM [facility] XR1 [facility] XRAY [place]   5/30/2024  1:30 PM Janey Prather, PERLA [facility] GYN [facility]   8/26/2024  1:20 [facility]         Follow up in about 3 months (around 8/23/2024).    Labs thoroughly reviewed with patient. Medication refills addressed today.  RTC prn and 3 months, with labs 1 week prior to the apt.  COVID 19 precautions given to patient.  Patient voices understanding of all discharge instructions.      KARY Merrill

## 2024-05-27 ENCOUNTER — TELEPHONE (OUTPATIENT)
Dept: INTERNAL MEDICINE | Facility: CLINIC | Age: 69
End: 2024-05-27
Payer: MEDICARE

## 2024-05-27 NOTE — TELEPHONE ENCOUNTER
----- Message from KARY Merrill sent at 5/24/2024  9:44 PM CDT -----  Please let pt know that her CXR was normal. Will wait to review ECHO that was ordered at her last visit.

## 2024-07-08 ENCOUNTER — OFFICE VISIT (OUTPATIENT)
Dept: PAIN MEDICINE | Facility: CLINIC | Age: 69
End: 2024-07-08
Payer: MEDICARE

## 2024-07-08 VITALS
DIASTOLIC BLOOD PRESSURE: 75 MMHG | WEIGHT: 162 LBS | SYSTOLIC BLOOD PRESSURE: 136 MMHG | HEIGHT: 62 IN | BODY MASS INDEX: 29.81 KG/M2 | HEART RATE: 81 BPM

## 2024-07-08 DIAGNOSIS — M54.9 CHRONIC NECK AND BACK PAIN: Primary | ICD-10-CM

## 2024-07-08 DIAGNOSIS — M54.2 CHRONIC NECK AND BACK PAIN: Primary | ICD-10-CM

## 2024-07-08 DIAGNOSIS — G89.29 CHRONIC NECK AND BACK PAIN: Primary | ICD-10-CM

## 2024-07-08 PROCEDURE — 1159F MED LIST DOCD IN RCRD: CPT | Mod: CPTII,,, | Performed by: NURSE PRACTITIONER

## 2024-07-08 PROCEDURE — 3078F DIAST BP <80 MM HG: CPT | Mod: CPTII,,, | Performed by: NURSE PRACTITIONER

## 2024-07-08 PROCEDURE — 1160F RVW MEDS BY RX/DR IN RCRD: CPT | Mod: CPTII,,, | Performed by: NURSE PRACTITIONER

## 2024-07-08 PROCEDURE — 99205 OFFICE O/P NEW HI 60 MIN: CPT | Mod: 25,,, | Performed by: NURSE PRACTITIONER

## 2024-07-08 PROCEDURE — 3008F BODY MASS INDEX DOCD: CPT | Mod: CPTII,,, | Performed by: NURSE PRACTITIONER

## 2024-07-08 PROCEDURE — 3075F SYST BP GE 130 - 139MM HG: CPT | Mod: CPTII,,, | Performed by: NURSE PRACTITIONER

## 2024-07-08 PROCEDURE — 1100F PTFALLS ASSESS-DOCD GE2>/YR: CPT | Mod: CPTII,,, | Performed by: NURSE PRACTITIONER

## 2024-07-08 PROCEDURE — 3288F FALL RISK ASSESSMENT DOCD: CPT | Mod: CPTII,,, | Performed by: NURSE PRACTITIONER

## 2024-07-08 PROCEDURE — 96372 THER/PROPH/DIAG INJ SC/IM: CPT | Mod: ,,, | Performed by: NURSE PRACTITIONER

## 2024-07-08 PROCEDURE — 3044F HG A1C LEVEL LT 7.0%: CPT | Mod: CPTII,,, | Performed by: NURSE PRACTITIONER

## 2024-07-08 PROCEDURE — 1125F AMNT PAIN NOTED PAIN PRSNT: CPT | Mod: CPTII,,, | Performed by: NURSE PRACTITIONER

## 2024-07-08 RX ORDER — METHYLPREDNISOLONE ACETATE 80 MG/ML
80 INJECTION, SUSPENSION INTRA-ARTICULAR; INTRALESIONAL; INTRAMUSCULAR; SOFT TISSUE
Status: COMPLETED | OUTPATIENT
Start: 2024-07-08 | End: 2024-07-08

## 2024-07-08 RX ADMIN — METHYLPREDNISOLONE ACETATE 80 MG: 80 INJECTION, SUSPENSION INTRA-ARTICULAR; INTRALESIONAL; INTRAMUSCULAR; SOFT TISSUE at 03:07

## 2024-07-08 NOTE — PATIENT INSTRUCTIONS
You can take Tylenol daily. Do not exceed 3,000 mg in a day  You can also take Ibuprofen as needed for pain. Do not exceed 1200 mg in a day or combine with other anti-inflammatory meds (Mobic, etc)
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
## 2024-07-11 ENCOUNTER — HOSPITAL ENCOUNTER (OUTPATIENT)
Dept: CARDIOLOGY | Facility: HOSPITAL | Age: 69
Discharge: HOME | End: 2024-07-11
Attending: NURSE PRACTITIONER
Payer: MEDICARE

## 2024-07-11 VITALS
WEIGHT: 162 LBS | HEIGHT: 62 IN | DIASTOLIC BLOOD PRESSURE: 74 MMHG | SYSTOLIC BLOOD PRESSURE: 147 MMHG | BODY MASS INDEX: 29.81 KG/M2

## 2024-07-11 DIAGNOSIS — I10 HYPERTENSION, UNSPECIFIED TYPE: ICD-10-CM

## 2024-07-11 DIAGNOSIS — R60.9 EDEMA, UNSPECIFIED TYPE: ICD-10-CM

## 2024-07-11 LAB
APICAL FOUR CHAMBER EJECTION FRACTION: 71 %
APICAL TWO CHAMBER EJECTION FRACTION: 75 %
AV INDEX (PROSTH): 0.9
AV MEAN GRADIENT: 8 MMHG
AV PEAK GRADIENT: 16 MMHG
AV VALVE AREA BY VELOCITY RATIO: 2.58 CM²
AV VALVE AREA: 2.81 CM²
AV VELOCITY RATIO: 0.83
BSA FOR ECHO PROCEDURE: 1.79 M2
CV ECHO LV RWT: 0.48 CM
DOP CALC AO PEAK VEL: 2 M/S
DOP CALC AO VTI: 38.5 CM
DOP CALC LVOT AREA: 3.1 CM2
DOP CALC LVOT DIAMETER: 1.99 CM
DOP CALC LVOT PEAK VEL: 1.66 M/S
DOP CALC LVOT STROKE VOLUME: 108.18 CM3
DOP CALC MV VTI: 33.9 CM
DOP CALCLVOT PEAK VEL VTI: 34.8 CM
E WAVE DECELERATION TIME: 222.94 MSEC
E/A RATIO: 0.86
ECHO LV POSTERIOR WALL: 0.91 CM (ref 0.6–1.1)
FRACTIONAL SHORTENING: 39 % (ref 28–44)
HR MV ECHO: 68 BPM
INTERVENTRICULAR SEPTUM: 0.94 CM (ref 0.6–1.1)
IVC DIAMETER: 2 CM
LEFT ATRIUM AREA SYSTOLIC (APICAL 2 CHAMBER): 12.55 CM2
LEFT ATRIUM AREA SYSTOLIC (APICAL 4 CHAMBER): 10.12 CM2
LEFT ATRIUM SIZE: 3.25 CM
LEFT ATRIUM VOLUME INDEX MOD: 12.5 ML/M2
LEFT ATRIUM VOLUME MOD: 21.82 CM3
LEFT INTERNAL DIMENSION IN SYSTOLE: 2.33 CM (ref 2.1–4)
LEFT VENTRICLE DIASTOLIC VOLUME INDEX: 36.16 ML/M2
LEFT VENTRICLE DIASTOLIC VOLUME: 63.28 ML
LEFT VENTRICLE END DIASTOLIC VOLUME APICAL 2 CHAMBER: 42.18 ML
LEFT VENTRICLE END DIASTOLIC VOLUME APICAL 4 CHAMBER: 49.69 ML
LEFT VENTRICLE END SYSTOLIC VOLUME APICAL 2 CHAMBER: 27.51 ML
LEFT VENTRICLE END SYSTOLIC VOLUME APICAL 4 CHAMBER: 17.59 ML
LEFT VENTRICLE MASS INDEX: 61 G/M2
LEFT VENTRICLE SYSTOLIC VOLUME INDEX: 10.8 ML/M2
LEFT VENTRICLE SYSTOLIC VOLUME: 18.82 ML
LEFT VENTRICULAR INTERNAL DIMENSION IN DIASTOLE: 3.83 CM (ref 3.5–6)
LEFT VENTRICULAR MASS: 106.32 G
LV LATERAL E/E' RATIO: 8.9 M/S
LVED V (TEICH): 63.28 ML
LVES V (TEICH): 18.82 ML
LVOT MG: 5.17 MMHG
LVOT MV: 1.05 CM/S
MV MEAN GRADIENT: 2 MMHG
MV PEAK A VEL: 1.03 M/S
MV PEAK E VEL: 0.89 M/S
MV PEAK GRADIENT: 5 MMHG
MV VALVE AREA BY CONTINUITY EQUATION: 3.19 CM2
OHS LV EJECTION FRACTION SIMPSONS BIPLANE MOD: 73 %
PISA MRMAX VEL: 6.16 M/S
PISA TR MAX VEL: 2.38 M/S
RA MAJOR: 4.13 CM
RA PRESSURE ESTIMATED: 3 MMHG
RV TB RVSP: 5 MMHG
TDI LATERAL: 0.1 M/S
TR MAX PG: 23 MMHG
TRICUSPID ANNULAR PLANE SYSTOLIC EXCURSION: 2.25 CM
TV REST PULMONARY ARTERY PRESSURE: 26 MMHG
Z-SCORE OF LEFT VENTRICULAR DIMENSION IN END DIASTOLE: -2.34
Z-SCORE OF LEFT VENTRICULAR DIMENSION IN END SYSTOLE: -1.99

## 2024-07-11 PROCEDURE — 93306 TTE W/DOPPLER COMPLETE: CPT

## 2024-07-14 ENCOUNTER — TELEPHONE (OUTPATIENT)
Dept: INTERNAL MEDICINE | Facility: CLINIC | Age: 69
End: 2024-07-14

## 2024-07-14 NOTE — TELEPHONE ENCOUNTER
----- Message from Falguni Serrano sent at 7/11/2024  2:20 PM CDT -----  Regarding: results  Patient is requesting she gets a phone call with her echo and xray results, via phone call.

## 2024-07-26 ENCOUNTER — HOSPITAL ENCOUNTER (EMERGENCY)
Facility: HOSPITAL | Age: 69
Discharge: HOME | End: 2024-07-26
Attending: FAMILY MEDICINE
Payer: MEDICARE

## 2024-07-26 VITALS
TEMPERATURE: 98 F | SYSTOLIC BLOOD PRESSURE: 132 MMHG | BODY MASS INDEX: 28.58 KG/M2 | HEIGHT: 62 IN | WEIGHT: 155.31 LBS | HEART RATE: 71 BPM | RESPIRATION RATE: 18 BRPM | OXYGEN SATURATION: 99 % | DIASTOLIC BLOOD PRESSURE: 81 MMHG

## 2024-07-26 DIAGNOSIS — Z20.822 EXPOSURE TO COVID-19 VIRUS: Primary | ICD-10-CM

## 2024-07-26 LAB
FLUAV AG UPPER RESP QL IA.RAPID: NOT DETECTED
FLUBV AG UPPER RESP QL IA.RAPID: NOT DETECTED
RSV A 5' UTR RNA NPH QL NAA+PROBE: NOT DETECTED
SARS-COV-2 RNA RESP QL NAA+PROBE: NOT DETECTED

## 2024-07-26 PROCEDURE — 99282 EMERGENCY DEPT VISIT SF MDM: CPT

## 2024-07-26 PROCEDURE — 0241U COVID/RSV/FLU A&B PCR: CPT | Performed by: NURSE PRACTITIONER

## 2024-07-27 NOTE — ED PROVIDER NOTES
Encounter Date: 7/26/2024       History     Chief Complaint   Patient presents with    COVID-19 Concerns     Patient presents to the ER w/ concerns that she may have a virus. She states she has had contact w/ 2 people who have the virus. She denies having any symptoms but would like to be safe.     Patient reports exposure to covid. She is asymptomatic. She request covid test. She denies shortness of breath, chest pain, cough, fever, and chills.      Review of patient's allergies indicates:   Allergen Reactions    Clindamycin Itching    Latex      Other reaction(s): itching     Past Medical History:   Diagnosis Date    Anxiety     Hyperlipidemia     Hypertension      Past Surgical History:   Procedure Laterality Date    CATARACT EXTRACTION Right 11/2020    CATARACT EXTRACTION Left 12/2020    COLON SURGERY      HYSTERECTOMY      NECK SURGERY      SHOULDER SURGERY      TONSILLECTOMY       Family History   Problem Relation Name Age of Onset    Diabetes Mother      Heart disease Mother      Hypertension Mother      Diabetes Father      Diabetes Sister      Hypertension Sister      Diabetes Sister      Hypertension Sister      Diabetes Sister       Social History     Tobacco Use    Smoking status: Never    Smokeless tobacco: Never   Substance Use Topics    Alcohol use: Never    Drug use: Never     Review of Systems   Constitutional:  Negative for fever.   HENT:  Negative for sore throat.    Respiratory:  Negative for shortness of breath.    Cardiovascular:  Negative for chest pain.   Gastrointestinal:  Negative for nausea.   Genitourinary:  Negative for dysuria.   Musculoskeletal:  Negative for back pain.   Skin:  Negative for rash.   Neurological:  Negative for weakness.   Hematological:  Does not bruise/bleed easily.   All other systems reviewed and are negative.      Physical Exam     Initial Vitals [07/26/24 1933]   BP Pulse Resp Temp SpO2   138/78 76 18 98.3 °F (36.8 °C) 99 %      MAP       --         Physical  Exam    Nursing note and vitals reviewed.  Constitutional: She appears well-developed and well-nourished.   HENT:   Head: Normocephalic and atraumatic.   Right Ear: Tympanic membrane normal.   Left Ear: Tympanic membrane normal.   Nose: Nose normal.   Mouth/Throat: Uvula is midline, oropharynx is clear and moist and mucous membranes are normal.   Neck: Neck supple.   Normal range of motion.  Cardiovascular:  Normal rate, regular rhythm, normal heart sounds and intact distal pulses.           Pulmonary/Chest: Effort normal and breath sounds normal. She has no decreased breath sounds.   Abdominal: Abdomen is soft and flat. Bowel sounds are normal. There is no abdominal tenderness.   Musculoskeletal:         General: Normal range of motion.      Cervical back: Normal range of motion and neck supple.     Neurological: She is alert. She has normal strength.   Skin: Skin is warm and dry.   Psychiatric: She has a normal mood and affect.         ED Course   Procedures  Labs Reviewed   COVID/RSV/FLU A&B PCR - Normal       Result Value    Influenza A PCR Not Detected      Influenza B PCR Not Detected      Respiratory Syncytial Virus PCR Not Detected      SARS-CoV-2 PCR Not Detected      Narrative:     The Xpert Xpress SARS-CoV-2/FLU/RSV plus is a rapid, multiplexed real-time PCR test intended for the simultaneous qualitative detection and differentiation of SARS-CoV-2, Influenza A, Influenza B, and respiratory syncytial virus (RSV) viral RNA in either nasopharyngeal swab or nasal swab specimens.                Imaging Results    None          Medications - No data to display  Medical Decision Making  Patient reports exposure to covid. She is asymptomatic. She request covid test. She denies shortness of breath, chest pain, cough, fever, and chills.    Covid negative.9:58 PM DISPOSITION: The patient is resting comfortably in no acute distress.  She is hemodynamically stable and is without objective evidence for acute process  requiring urgent intervention or hospitalization. I provided counseling to patient with regard to condition, the treatment plan, specific conditions for return, and the importance of follow up. Detailed written and verbal instructions provided to patient and she expressed a verbal understanding of the discharge instructions and overall management plan. Reiterated the importance of medication administration and safety and advised patient to follow up with primary care provider in 3-5 days or sooner if needed.  Answered questions at this time. The patient is stable for discharge.         Additional MDM:   Differential Diagnosis:   At this time differential diagnosis is but not limited to influenza, strep throat, covid, rsv, viral uri              ED Course as of 07/26/24 2159 Fri Jul 26, 2024 2156 Given strict ED return precautions. I have spoken with the patient and/or caregivers. I have explained the patient's condition, diagnoses and treatment plan based on the information available to me at this time. I have answered the patient's and/or caregiver's questions and addressed any concerns. The patient and/or caregivers have as good an understanding of the patient's diagnosis, condition and treatment plan as can be expected at this point. The vital signs have been stable. The patient's condition is stable and appropriate for discharge from the emergency department.      The patient will pursue further outpatient evaluation with the primary care physician or other designated or consulting physician as outlined in the discharge instructions. The patient and/or caregivers are agreeable to this plan of care and follow-up instructions have been explained in detail. The patient and/or caregivers have received these instructions in written format and have expressed an understanding of the discharge instructions. The patient and/or caregivers are aware that any significant change in condition or worsening of symptoms  should prompt an immediate return to this or the closest emergency department or a call to 911.      [RB]      ED Course User Index  [RB] Rambo Nieves ACNP                           Clinical Impression:  Final diagnoses:  [Z20.822] Exposure to COVID-19 virus (Primary)          ED Disposition Condition    Discharge Stable          ED Prescriptions    None       Follow-up Information       Follow up With Specialties Details Why Contact Info    Ludy Le, FNP Family Medicine  As needed [address] [facility] [address]  [phone]      [facility] Emergency Medicine  If symptoms worsen [address] [facility] [id number] [phone]             Rambo Nieves ACNP  07/26/24 2157

## 2024-07-30 ENCOUNTER — TELEPHONE (OUTPATIENT)
Dept: INTERNAL MEDICINE | Facility: CLINIC | Age: 69
End: 2024-07-30
Payer: MEDICARE

## 2024-07-30 NOTE — TELEPHONE ENCOUNTER
----- Message from Azeb Zhang sent at 7/29/2024  1:49 PM CDT -----  Who Called: Lucila Qasim    Caller is requesting information on test results.    Name of Test (Lab/Mammo/Etc):  XR, EMT... etc  Date of Test: 2-3 weeks ago  Where the test was performed:  [facility]  Ordering Provider: enriqueta    Preferred Method of Contact: Phone Call  Patient's Preferred Phone Number on File: [phone]   Best Call Back Number, if different:  Additional Information:  results, please advise, thanks

## 2024-07-30 NOTE — TELEPHONE ENCOUNTER
Pt returned call to clinic and stated she is requesting the results on her ECHO and EMG and does not want to wait until her NOV to find out. Please advise.

## 2024-08-01 ENCOUNTER — OFFICE VISIT (OUTPATIENT)
Dept: PAIN MEDICINE | Facility: CLINIC | Age: 69
End: 2024-08-01
Payer: MEDICARE

## 2024-08-01 DIAGNOSIS — M51.36 DDD (DEGENERATIVE DISC DISEASE), LUMBAR: ICD-10-CM

## 2024-08-01 DIAGNOSIS — M54.2 CERVICALGIA: Primary | ICD-10-CM

## 2024-08-01 DIAGNOSIS — M47.816 LUMBAR FACET ARTHROPATHY: ICD-10-CM

## 2024-08-01 PROCEDURE — 99214 OFFICE O/P EST MOD 30 MIN: CPT | Mod: ,,, | Performed by: NURSE PRACTITIONER

## 2024-08-01 PROCEDURE — 1160F RVW MEDS BY RX/DR IN RCRD: CPT | Mod: CPTII,,, | Performed by: NURSE PRACTITIONER

## 2024-08-01 PROCEDURE — 1159F MED LIST DOCD IN RCRD: CPT | Mod: CPTII,,, | Performed by: NURSE PRACTITIONER

## 2024-08-01 PROCEDURE — 3044F HG A1C LEVEL LT 7.0%: CPT | Mod: CPTII,,, | Performed by: NURSE PRACTITIONER

## 2024-08-01 PROCEDURE — 3288F FALL RISK ASSESSMENT DOCD: CPT | Mod: CPTII,,, | Performed by: NURSE PRACTITIONER

## 2024-08-01 PROCEDURE — 1101F PT FALLS ASSESS-DOCD LE1/YR: CPT | Mod: CPTII,,, | Performed by: NURSE PRACTITIONER

## 2024-08-01 PROCEDURE — 1125F AMNT PAIN NOTED PAIN PRSNT: CPT | Mod: CPTII,,, | Performed by: NURSE PRACTITIONER

## 2024-08-05 ENCOUNTER — HOSPITAL ENCOUNTER (OUTPATIENT)
Dept: RADIOLOGY | Facility: HOSPITAL | Age: 69
Discharge: HOME | End: 2024-08-05
Attending: NURSE PRACTITIONER
Payer: MEDICARE

## 2024-08-05 DIAGNOSIS — M54.2 CHRONIC NECK AND BACK PAIN: ICD-10-CM

## 2024-08-05 DIAGNOSIS — G89.29 CHRONIC NECK AND BACK PAIN: ICD-10-CM

## 2024-08-05 DIAGNOSIS — M54.9 CHRONIC NECK AND BACK PAIN: ICD-10-CM

## 2024-08-05 PROCEDURE — 72141 MRI NECK SPINE W/O DYE: CPT | Mod: TC

## 2024-08-07 ENCOUNTER — OFFICE VISIT (OUTPATIENT)
Dept: INTERNAL MEDICINE | Facility: CLINIC | Age: 69
End: 2024-08-07
Payer: MEDICARE

## 2024-08-07 DIAGNOSIS — I10 HYPERTENSION, UNSPECIFIED TYPE: ICD-10-CM

## 2024-08-07 DIAGNOSIS — I34.0 MITRAL VALVE INSUFFICIENCY, UNSPECIFIED ETIOLOGY: ICD-10-CM

## 2024-08-07 DIAGNOSIS — I35.8 MILD AORTIC VALVE SCLEROSIS: ICD-10-CM

## 2024-08-07 DIAGNOSIS — R60.9 EDEMA, UNSPECIFIED TYPE: ICD-10-CM

## 2024-08-07 PROCEDURE — 1159F MED LIST DOCD IN RCRD: CPT | Mod: CPTII,95,, | Performed by: NURSE PRACTITIONER

## 2024-08-07 PROCEDURE — 99214 OFFICE O/P EST MOD 30 MIN: CPT | Mod: 95,,, | Performed by: NURSE PRACTITIONER

## 2024-08-07 PROCEDURE — 3044F HG A1C LEVEL LT 7.0%: CPT | Mod: CPTII,95,, | Performed by: NURSE PRACTITIONER

## 2024-08-13 ENCOUNTER — TELEPHONE (OUTPATIENT)
Dept: INTERNAL MEDICINE | Facility: CLINIC | Age: 69
End: 2024-08-13
Payer: MEDICARE

## 2024-08-13 DIAGNOSIS — E04.2 MULTINODULAR THYROID: Primary | ICD-10-CM

## 2024-08-13 NOTE — TELEPHONE ENCOUNTER
"Spoke with patient she informed me that it was a test for her thyroid.After speaking with patient"s PCP it was noted that ttest was U/S of thyroid,U/S was reordered and patient notified  Informed that she would be contacted for appt or she could call and schedule.Patient acknowledged undestanding  "

## 2024-08-13 NOTE — TELEPHONE ENCOUNTER
----- Message from KARY Merrill sent at 2024  1:18 PM CDT -----    ----- Message -----  From: Lexus Deluna LPN  Sent: 2024  11:11 AM CDT  To: KARY Merrill      ----- Message -----  From: Desi Mcintosh  Sent: 2024   3:26 PM CDT  To: Mimi PRIEST Staff    Who Called: Lucila Tripp    What order is the patient requesting: MRI   When does the expect the orders to be performed?       Preferred Method of Contact: Phone Call  Patient's Preferred Phone Number on File: [phone]   Best Call Back Number, if different:  Additional Information: Pt is requesting MRI orders be put into [facility] stated her last order .

## 2024-08-16 ENCOUNTER — TELEPHONE (OUTPATIENT)
Dept: INTERNAL MEDICINE | Facility: CLINIC | Age: 69
End: 2024-08-16
Payer: MEDICARE

## 2024-08-16 NOTE — TELEPHONE ENCOUNTER
Called pt to inform her of US order for Thyroid placed. LVM to return call to [facility]  for further discussion.

## 2024-08-16 NOTE — TELEPHONE ENCOUNTER
----- Message from [facility] sent at 8/14/2024 10:26 AM CDT -----  .Type:  Needs Medical Advice    Who Called:  pt    Symptoms (please be specific):  no     How long has patient had these symptoms:   no    Pharmacy name and phone #:   no    Would the patient rather a call back or a response via [facility]?      Best Call Back Number:  [phone]    Additional Information:   pt wants to know if she  needs to do an ultrasound of her head, neck, and thyroid if so please advise thanks

## 2024-08-22 ENCOUNTER — LAB VISIT (OUTPATIENT)
Dept: LAB | Facility: HOSPITAL | Age: 69
End: 2024-08-22
Attending: NURSE PRACTITIONER
Payer: MEDICARE

## 2024-08-22 DIAGNOSIS — I10 HYPERTENSION, UNSPECIFIED TYPE: ICD-10-CM

## 2024-08-22 DIAGNOSIS — E78.5 HYPERLIPIDEMIA, UNSPECIFIED HYPERLIPIDEMIA TYPE: ICD-10-CM

## 2024-08-22 DIAGNOSIS — R73.03 PREDIABETES: ICD-10-CM

## 2024-08-22 LAB
ALBUMIN SERPL-MCNC: 4 G/DL (ref 3.4–4.8)
ALBUMIN/GLOB SERPL: 1.1 RATIO (ref 1.1–2)
ALP SERPL-CCNC: 93 UNIT/L (ref 40–150)
ALT SERPL-CCNC: 21 UNIT/L (ref 0–55)
ANION GAP SERPL CALC-SCNC: 7 MEQ/L
AST SERPL-CCNC: 17 UNIT/L (ref 5–34)
BASOPHILS # BLD AUTO: 0.02 X10(3)/MCL
BASOPHILS NFR BLD AUTO: 0.2 %
BILIRUB SERPL-MCNC: 0.2 MG/DL
BUN SERPL-MCNC: 12.3 MG/DL (ref 9.8–20.1)
CALCIUM SERPL-MCNC: 10 MG/DL (ref 8.4–10.2)
CHLORIDE SERPL-SCNC: 110 MMOL/L (ref 98–107)
CHOLEST SERPL-MCNC: 244 MG/DL
CHOLEST/HDLC SERPL: 5 {RATIO} (ref 0–5)
CO2 SERPL-SCNC: 27 MMOL/L (ref 23–31)
CREAT SERPL-MCNC: 0.61 MG/DL (ref 0.55–1.02)
CREAT/UREA NIT SERPL: 20
EOSINOPHIL # BLD AUTO: 0.09 X10(3)/MCL (ref 0–0.9)
EOSINOPHIL NFR BLD AUTO: 1.1 %
ERYTHROCYTE [DISTWIDTH] IN BLOOD BY AUTOMATED COUNT: 15.3 % (ref 11.5–17)
EST. AVERAGE GLUCOSE BLD GHB EST-MCNC: 111.2 MG/DL
GFR SERPLBLD CREATININE-BSD FMLA CKD-EPI: >60 ML/MIN/1.73/M2
GLOBULIN SER-MCNC: 3.7 GM/DL (ref 2.4–3.5)
GLUCOSE SERPL-MCNC: 108 MG/DL (ref 82–115)
HBA1C MFR BLD: 5.5 %
HCT VFR BLD AUTO: 37.5 % (ref 37–47)
HDLC SERPL-MCNC: 54 MG/DL (ref 35–60)
HGB BLD-MCNC: 13.1 G/DL (ref 12–16)
IMM GRANULOCYTES # BLD AUTO: 0.02 X10(3)/MCL (ref 0–0.04)
IMM GRANULOCYTES NFR BLD AUTO: 0.2 %
LDLC SERPL CALC-MCNC: 143 MG/DL (ref 50–140)
LYMPHOCYTES # BLD AUTO: 2.8 X10(3)/MCL (ref 0.6–4.6)
LYMPHOCYTES NFR BLD AUTO: 33.9 %
MCH RBC QN AUTO: 26.1 PG (ref 27–31)
MCHC RBC AUTO-ENTMCNC: 34.9 G/DL (ref 33–36)
MCV RBC AUTO: 74.9 FL (ref 80–94)
MONOCYTES # BLD AUTO: 0.52 X10(3)/MCL (ref 0.1–1.3)
MONOCYTES NFR BLD AUTO: 6.3 %
NEUTROPHILS # BLD AUTO: 4.81 X10(3)/MCL (ref 2.1–9.2)
NEUTROPHILS NFR BLD AUTO: 58.3 %
NRBC BLD AUTO-RTO: 0 %
PLATELET # BLD AUTO: 312 X10(3)/MCL (ref 130–400)
PMV BLD AUTO: 11.4 FL (ref 7.4–10.4)
POTASSIUM SERPL-SCNC: 3.5 MMOL/L (ref 3.5–5.1)
PROT SERPL-MCNC: 7.7 GM/DL (ref 5.8–7.6)
RBC # BLD AUTO: 5.01 X10(6)/MCL (ref 4.2–5.4)
SODIUM SERPL-SCNC: 144 MMOL/L (ref 136–145)
TRIGL SERPL-MCNC: 237 MG/DL (ref 37–140)
TSH SERPL-ACNC: 1.96 UIU/ML (ref 0.35–4.94)
VLDLC SERPL CALC-MCNC: 47 MG/DL
WBC # BLD AUTO: 8.26 X10(3)/MCL (ref 4.5–11.5)

## 2024-08-22 PROCEDURE — 84443 ASSAY THYROID STIM HORMONE: CPT

## 2024-08-22 PROCEDURE — 80053 COMPREHEN METABOLIC PANEL: CPT

## 2024-08-22 PROCEDURE — 85025 COMPLETE CBC W/AUTO DIFF WBC: CPT

## 2024-08-22 PROCEDURE — 83036 HEMOGLOBIN GLYCOSYLATED A1C: CPT

## 2024-08-22 PROCEDURE — 80061 LIPID PANEL: CPT

## 2024-08-22 PROCEDURE — 36415 COLL VENOUS BLD VENIPUNCTURE: CPT

## 2024-08-26 ENCOUNTER — OFFICE VISIT (OUTPATIENT)
Dept: INTERNAL MEDICINE | Facility: CLINIC | Age: 69
End: 2024-08-26
Payer: MEDICARE

## 2024-08-26 VITALS
HEIGHT: 62 IN | SYSTOLIC BLOOD PRESSURE: 123 MMHG | TEMPERATURE: 98 F | HEART RATE: 75 BPM | RESPIRATION RATE: 16 BRPM | BODY MASS INDEX: 29.44 KG/M2 | WEIGHT: 160 LBS | DIASTOLIC BLOOD PRESSURE: 66 MMHG

## 2024-08-26 DIAGNOSIS — E55.9 VITAMIN D DEFICIENCY: ICD-10-CM

## 2024-08-26 DIAGNOSIS — M54.2 CHRONIC NECK AND BACK PAIN: ICD-10-CM

## 2024-08-26 DIAGNOSIS — G89.29 CHRONIC NECK AND BACK PAIN: ICD-10-CM

## 2024-08-26 DIAGNOSIS — R73.03 PREDIABETES: ICD-10-CM

## 2024-08-26 DIAGNOSIS — Q17.8 SPLIT EAR LOBE: ICD-10-CM

## 2024-08-26 DIAGNOSIS — I10 HYPERTENSION, UNSPECIFIED TYPE: ICD-10-CM

## 2024-08-26 DIAGNOSIS — M54.9 CHRONIC NECK AND BACK PAIN: ICD-10-CM

## 2024-08-26 DIAGNOSIS — E78.5 HYPERLIPIDEMIA, UNSPECIFIED HYPERLIPIDEMIA TYPE: ICD-10-CM

## 2024-08-26 DIAGNOSIS — Z12.11 COLON CANCER SCREENING: ICD-10-CM

## 2024-08-26 PROCEDURE — 3288F FALL RISK ASSESSMENT DOCD: CPT | Mod: CPTII,,, | Performed by: NURSE PRACTITIONER

## 2024-08-26 PROCEDURE — 99214 OFFICE O/P EST MOD 30 MIN: CPT | Mod: S$PBB,,, | Performed by: NURSE PRACTITIONER

## 2024-08-26 PROCEDURE — 1126F AMNT PAIN NOTED NONE PRSNT: CPT | Mod: CPTII,,, | Performed by: NURSE PRACTITIONER

## 2024-08-26 PROCEDURE — 1101F PT FALLS ASSESS-DOCD LE1/YR: CPT | Mod: CPTII,,, | Performed by: NURSE PRACTITIONER

## 2024-08-26 PROCEDURE — 3074F SYST BP LT 130 MM HG: CPT | Mod: CPTII,,, | Performed by: NURSE PRACTITIONER

## 2024-08-26 PROCEDURE — 3078F DIAST BP <80 MM HG: CPT | Mod: CPTII,,, | Performed by: NURSE PRACTITIONER

## 2024-08-26 PROCEDURE — 1160F RVW MEDS BY RX/DR IN RCRD: CPT | Mod: CPTII,,, | Performed by: NURSE PRACTITIONER

## 2024-08-26 PROCEDURE — 99215 OFFICE O/P EST HI 40 MIN: CPT | Mod: PBBFAC | Performed by: NURSE PRACTITIONER

## 2024-08-26 PROCEDURE — 3044F HG A1C LEVEL LT 7.0%: CPT | Mod: CPTII,,, | Performed by: NURSE PRACTITIONER

## 2024-08-26 PROCEDURE — 3008F BODY MASS INDEX DOCD: CPT | Mod: CPTII,,, | Performed by: NURSE PRACTITIONER

## 2024-08-26 PROCEDURE — 1159F MED LIST DOCD IN RCRD: CPT | Mod: CPTII,,, | Performed by: NURSE PRACTITIONER

## 2024-08-26 RX ORDER — HYDROCHLOROTHIAZIDE 25 MG/1
25 TABLET ORAL DAILY
Qty: 90 TABLET | Refills: 1 | Status: SHIPPED | OUTPATIENT
Start: 2024-08-26 | End: 2025-08-26

## 2024-08-26 RX ORDER — EZETIMIBE 10 MG/1
10 TABLET ORAL DAILY
Qty: 90 TABLET | Refills: 1 | Status: SHIPPED | OUTPATIENT
Start: 2024-08-26 | End: 2025-08-26

## 2024-08-26 RX ORDER — AMLODIPINE BESYLATE 10 MG/1
10 TABLET ORAL DAILY
Qty: 90 TABLET | Refills: 1 | Status: SHIPPED | OUTPATIENT
Start: 2024-08-26

## 2024-08-26 RX ORDER — POTASSIUM CHLORIDE 20 MEQ/1
20 TABLET, EXTENDED RELEASE ORAL 2 TIMES DAILY
Qty: 180 TABLET | Refills: 1 | Status: SHIPPED | OUTPATIENT
Start: 2024-08-26

## 2024-08-26 RX ORDER — LEVOCETIRIZINE DIHYDROCHLORIDE 5 MG/1
5 TABLET, FILM COATED ORAL NIGHTLY
Qty: 90 TABLET | Refills: 1 | Status: SHIPPED | OUTPATIENT
Start: 2024-08-26 | End: 2025-08-26

## 2024-08-26 RX ORDER — TIZANIDINE 4 MG/1
4 TABLET ORAL NIGHTLY PRN
Qty: 45 TABLET | Refills: 3 | Status: SHIPPED | OUTPATIENT
Start: 2024-08-26

## 2024-08-26 RX ORDER — MELOXICAM 7.5 MG/1
7.5 TABLET ORAL DAILY
Qty: 90 TABLET | Refills: 1 | Status: SHIPPED | OUTPATIENT
Start: 2024-08-26

## 2024-08-26 RX ORDER — FENOFIBRATE 54 MG/1
54 TABLET ORAL DAILY
Qty: 90 TABLET | Refills: 1 | Status: SHIPPED | OUTPATIENT
Start: 2024-08-26 | End: 2025-08-26

## 2024-08-26 NOTE — PROGRESS NOTES
[facility]  KARY Merrill     Patient Name: Lucila Tripp   : 1955  MRN:[id number]     Chief Complaint     Chief Complaint   Patient presents with    Hypertension     Lab review        History of Present Illness     68 year old AAF, presents in clinic for lab review.  Following [facility] for chronic neck and back pain; reports offered medial branch block (MBB).   MRI C spine 24 displays cervical spine operative and degenerative changes level by level. Right thyroid gland nodule. Please further assess with ultrasound exam. US thyroid is scheduled on 24. TSH normal.  Voicing right ear lobe split, on 2 wks prior, after her ear ring got caught on her mattress cover. Denies any skin infection, request closure if possible.    PMH HTN, HLD, prediabetes, hypokalemia, AR, vit d def, right thyroid nodule, osteopenia. Pt is on Norvasc 10 qd, and HCTZ 25mg qd. Takes KDUR 20 bid. Pt is followed by mental health Dr. Zuleta, once every 3 months.  Patient is currently not in any counseling.  Following ENT; US guided thyroid nodule biopsy 20; benign; annual thyroid ultrasound ordered. TFT normal. Tells me she stopped her statin, Lipitor, several weeks ago due to leg pain/aches. States within 5 days of stopping statin the pain went away. Denies chest pain, shortness of breath, cough, fever, headache, dizziness, weakness, abdominal pain, nausea, vomiting, diarrhea, constipation, dysuria, depression, SI, HI, hallucinations.     MMG 4/90980; BIRADS 1  Cologuard ordered but sample collected could not be processed x2. Will order FIT. She is not ready to have colonoscopy performed.      Thyroid US: 10/11/2022;  Stable multinodular goiter; ENT clinic advised to repeat US in 2 yrs (due 10/2024)     ECHO 2024  Left Ventricle: The left ventricle is normal in size. Normal wall thickness. There is normal systolic function. Biplane (2D) method of discs ejection fraction is 73%.  Right Ventricle:  "Normal right ventricular cavity size. Systolic function is normal.  Aortic Valve: The aortic valve is a trileaflet valve. There is mild aortic valve sclerosis.  Mitral Valve: There is mild to moderate regurgitation.  Pulmonary Artery: The estimated pulmonary artery systolic pressure is 26 mmHg  IVC/SVC: Normal venous pressure at 3 mmHg.                         Review of Systems     Review of Systems   Constitutional: Negative.    HENT: Negative.     Eyes: Negative.    Respiratory: Negative.     Cardiovascular: Negative.    Gastrointestinal: Negative.    Endocrine: Negative.    Genitourinary: Negative.    Musculoskeletal:  Positive for back pain and neck pain.   Integumentary:         Right ear lobe split Negative.   Allergic/Immunologic: Negative.    Neurological: Negative.    Hematological: Negative.    Psychiatric/Behavioral: Negative.     All other systems reviewed and are negative.       Physical Examination     Visit Vitals  /66 (BP Location: Left arm, Patient Position: Sitting, BP Method: Medium (Manual))   Pulse 75   Temp 98.1 °F (36.7 °C) (Oral)   Resp 16   Ht 5' 2" (1.575 m)   Wt 72.6 kg (160 lb)   BMI 29.26 kg/m²        BP Readings from Last 6 Encounters:   08/26/24 123/66   07/26/24 132/81   07/11/24 (!) 147/74   07/08/24 136/75   05/23/24 103/60   01/22/24 122/67   ]    Wt Readings from Last 6 Encounters:   08/26/24 72.6 kg (160 lb)   07/26/24 70.5 kg (155 lb 5 oz)   07/11/24 73.5 kg (162 lb)   07/08/24 73.5 kg (162 lb)   05/23/24 73.5 kg (162 lb 0.6 oz)   01/22/24 74.4 kg (164 lb)   ]    BMI Readings from Last 3 Encounters:   08/26/24 29.26 kg/m²   07/26/24 28.41 kg/m²   07/11/24 29.63 kg/m²         Physical Exam  Vitals and nursing note reviewed.   Constitutional:       Appearance: Normal appearance.   HENT:      Head: Normocephalic and atraumatic.      Right Ear: Tympanic membrane, ear canal and external ear normal.      Left Ear: Tympanic membrane, ear canal and external ear normal.      Ears: "      Comments: Right ear lobe split noted with out closure, no infection     Nose: Nose normal.      Mouth/Throat:      Mouth: Mucous membranes are moist.      Pharynx: Oropharynx is clear.   Eyes:      Extraocular Movements: Extraocular movements intact.      Conjunctiva/sclera: Conjunctivae normal.      Pupils: Pupils are equal, round, and reactive to light.   Cardiovascular:      Rate and Rhythm: Normal rate and regular rhythm.      Pulses: Normal pulses.      Heart sounds: Normal heart sounds.   Pulmonary:      Effort: Pulmonary effort is normal.      Breath sounds: Normal breath sounds.   Abdominal:      General: Abdomen is flat. Bowel sounds are normal.      Palpations: Abdomen is soft.   Musculoskeletal:         General: Normal range of motion.      Cervical back: Normal range of motion and neck supple.   Skin:     General: Skin is warm and dry.      Capillary Refill: Capillary refill takes less than 2 seconds.   Neurological:      General: No focal deficit present.      Mental Status: She is alert and oriented to person, place, and time. Mental status is at baseline.   Psychiatric:         Mood and Affect: Mood normal.         Behavior: Behavior normal.         Thought Content: Thought content normal.         Judgment: Judgment normal.          Labs / Imaging     Chemistry:  Lab Results   Component Value Date     08/22/2024    K 3.5 08/22/2024    BUN 12.3 08/22/2024    CREATININE 0.61 08/22/2024    EGFRNORACEVR >60 08/22/2024    GLUCOSE 108 08/22/2024    CALCIUM 10.0 08/22/2024    ALKPHOS 93 08/22/2024    LABPROT 7.7 (H) 08/22/2024    ALBUMIN 4.0 08/22/2024    BILIDIR 0.2 03/15/2022    IBILI 0.30 03/15/2022    AST 17 08/22/2024    ALT 21 08/22/2024    MG 1.90 09/06/2023    WCDEMUNV13EG 32 05/23/2024        Lab Results   Component Value Date    HGBA1C 5.5 08/22/2024        Hematology:  Lab Results   Component Value Date    WBC 8.26 08/22/2024    RBC 5.01 08/22/2024    HGB 13.1 08/22/2024    HCT 37.5  08/22/2024    MCV 74.9 (L) 08/22/2024    MCH 26.1 (L) 08/22/2024    MCHC 34.9 08/22/2024    RDW 15.3 08/22/2024     08/22/2024    MPV 11.4 (H) 08/22/2024        Lipid Panel:  Lab Results   Component Value Date    CHOL 244 (H) 08/22/2024    HDL 54 08/22/2024    .00 (H) 08/22/2024    TRIG 237 (H) 08/22/2024    TOTALCHOLEST 5 08/22/2024        Urine:  Lab Results   Component Value Date    APPEARANCEUA Clear 08/22/2024    SGUA 1.016 08/22/2024    PROTEINUA Negative 08/22/2024    KETONESUA Negative 08/22/2024    LEUKOCYTESUR Negative 08/22/2024    RBCUA 0-5 08/22/2024    WBCUA 0-5 08/22/2024    BACTERIA None Seen 08/22/2024    SQEPUA Trace (A) 08/22/2024    HYALINECASTS None Seen 08/22/2024          Assessment       ICD-10-CM ICD-9-CM   1. Hypertension, unspecified type  I10 401.9   2. Hyperlipidemia, unspecified hyperlipidemia type  E78.5 272.4   3. Prediabetes  R73.03 790.29   4. Vitamin D deficiency  E55.9 268.9   5. Chronic neck and back pain  M54.2 723.1    M54.9 724.5    G89.29 338.29   6. BMI 29.0-29.9,adult  Z68.29 V85.25   7. Split ear lobe  Q17.8 744.29   8. Colon cancer screening  Z12.11 V76.51        Plan     1. Hypertension, unspecified type  BP and HR stable. Med refills. DASH diet: Eat more fruits, vegetables, and low fat dairy foods.  (Less than 2 grams of sodium per day).  Maintain healthy weight with goal BMI <30.   Exercise 30 minutes per day 5 days per week.  Home medications refilled and continued.   Home BP monitoring encouraged with BP parameters given.    - CBC Auto Differential; Future  - Comprehensive Metabolic Panel; Future  - Lipid Panel; Future  - Hemoglobin A1C; Future    2. Hyperlipidemia, unspecified hyperlipidemia type  Lab Results   Component Value Date    .00 (H) 08/22/2024    CHOL 244 (H) 08/22/2024    HDL 54 08/22/2024    TRIG 237 (H) 08/22/2024       Not at goal  RX zetia and fenofibrate daily; refills given. Take Omega 3 daily.   Stressed importance of dietary  modifications. Follow a low cholesterol, low saturated fat diet with less that 200mg of cholesterol a day.  Avoid fried foods and high saturated fats (high saturated fats less than 7% of calories).  Add Flax Seed/Fish Oil supplements to diet. Increase dietary fiber.  Regular exercise can reduce LDL and raise HDL. Stressed importance of physical activity 5 times per week for 30 minutes per day.    - CBC Auto Differential; Future  - Comprehensive Metabolic Panel; Future  - Lipid Panel; Future  - Hemoglobin A1C; Future    3. Prediabetes  A1C 5.5  Prediabetes is reversible. Close follow up is important.  Maintain healthy weight or lose weight.   Eat fewer refined carbohydrates and fats, and more fiber.  Read nutrition labels.  Reduce portion sizes.  Eat out less often. Avoid fast foods.  Drink water and unsweetened beverages.  Spending at least one hour every day in physical activity.   - CBC Auto Differential; Future  - Comprehensive Metabolic Panel; Future  - Lipid Panel; Future  - Hemoglobin A1C; Future    4. Vitamin D deficiency  Continue OTC Vitamin D3 2000 IU daily.    - Vitamin D; Future    5. Chronic neck and back pain  Refill on tizanidine, and mobic  Advised to continue PM  Lower back stretches daily.  Avoid strenuous lifting, use proper body mechanics.  Heating pad, Ice pack, Biofreeze or Epsom salt baths as needed.  Exercise to strengthen core muscles to support your back.  Consider physical therapy (PT)     6. BMI 29.0-29.9,adult  Goal BMI <30.  Exercise 5 times a week for 30 minutes per day.  Avoid soda, simple sugars, excessive rice, potatoes or bread. Limit fast foods and fried foods.  Choose complex carbs in moderation (example: green vegetables, beans, oatmeal). Eat plenty of fresh fruits and vegetables with lean meats daily.  Do not skip meals. Eat a balanced portion size.  Avoid fad diets. Consider permanent healthy life style changes.         7. Split ear lobe  No infection  Refer to ENT    8. Colon  cancer screening    - Cologuard Screening (Multitarget Stool DNA); Future  - Cologuard Screening (Multitarget Stool DNA)        Current Outpatient Medications   Medication Instructions    amLODIPine (NORVASC) 10 mg, Oral, Daily    azelastine (ASTELIN) 137 mcg, Nasal, 2 times daily    blood sugar diagnostic (ONETOUCH VERIO TEST STRIPS) Strp 1 each, Misc.(Non-Drug; Combo Route), Daily, DX: E11.9 Use daily to check CBG    blood sugar diagnostic Strp 1 each, Misc.(Non-Drug; Combo Route), Daily, TRUE METRIX    blood sugar diagnostic Strp One touch Verio  test strips, See Instructions, Use dailyto check glucose  DX :DM ,code:E11.9, # 100 EA, 0 Refill(s), Pharmacy: [id number], 155, cm, Height/Length Dosing, 10/20/21 15:35:00 CDT, 73, kg, Weight Dosing, 10/20/21 15:35:00 CDT    clotrimazole-betamethasone 1-0.05% (LOTRISONE) cream Topical (Top), 2 times daily PRN    clotrimazole-betamethasone 1-0.05% (LOTRISONE) cream Topical (Top), 2 times daily    EScitalopram oxalate (LEXAPRO) 20 mg, Oral, Daily    ezetimibe (ZETIA) 10 mg, Oral, Daily    fenofibrate (TRICOR) 54 mg, Oral, Daily    fluticasone propionate (FLONASE) 50 mcg, Each Nostril, Daily    hydroCHLOROthiazide (HYDRODIURIL) 25 mg, Oral, Daily    hydrOXYzine HCL (ATARAX) 25 mg, Oral, 3 times daily PRN    lancets (ONETOUCH DELICA PLUS LANCET) 33 gauge Misc 1 lancet , Misc.(Non-Drug; Combo Route), Daily    lancets 33 gauge Misc Misc.(Non-Drug; Combo Route)    lancets Misc   One Touch Delica Lancets 33g, See Instructions, Use daily to check glucose  Dx DM: code:E11.9, # 100 EA, 0 Refill(s), Pharmacy: [id number], 155, cm, Height/Length Dosing, 10/20/21 15:35:00 CDT, 73, kg, Weight Dosing, 10/20/21 15:35:00 CDT    LATUDA 40 mg Tab tablet 1 tablet, Oral, Daily    levocetirizine (XYZAL) 5 mg, Oral, Nightly    meloxicam (MOBIC) 7.5 mg, Oral, Daily    mirtazapine (REMERON) 30 mg, Oral, Nightly    mupirocin (BACTROBAN) 2 % ointment Topical (Top), 2 times  daily    ONETOUCH DELICA PLUS LANCET 33 gauge Misc USE 1 LANCET TO CHECK GLUCOSE ONCE DAILY    potassium chloride SA (K-DUR,KLOR-CON) 20 MEQ tablet 20 mEq, Oral, 2 times daily    prazosin (MINIPRESS) 5 mg, Oral, Nightly    pregabalin (LYRICA) 150 mg, 2 times daily    pregabalin (LYRICA) 150 mg, Oral, 2 times daily    tiZANidine (ZANAFLEX) 4 mg, Oral, Nightly PRN    traMADoL (ULTRAM) 50 mg, 2 times daily PRN    traZODone (DESYREL) 100 mg, Oral, Nightly    triamcinolone acetonide 0.1% (KENALOG) 0.1 % cream Topical (Top), 3 times daily PRN    triamcinolone acetonide 0.1% (KENALOG) 0.1 % ointment   See Instructions, TOP TID to affected area prn rash, # 60 gm, 1 Refill(s), Pharmacy: [id number], 155, cm, Height/Length Dosing, 10/20/21 13:35:00 CDT, 73.3, kg, Weight Dosing, 10/20/21 13:35:00 CDT       Orders Placed This Encounter   Procedures    Cologuard Screening (Multitarget Stool DNA)    CBC Auto Differential    Comprehensive Metabolic Panel    Lipid Panel    Hemoglobin A1C    Vitamin D         Future Appointments   Date Time Provider Department Center   9/11/2024  9:30 AM [address]   10/2/2024  8:00 AM Keyla Gonzalez PA-C [facility]   12/30/2024  2:00 PM Ludy Le FNP [facility]   1/27/2025  1:10 PM Janey Prather ANP [facility]        Follow up in about 4 months (around 12/26/2024) for lab review.    Labs thoroughly reviewed with patient. Medication refills addressed today.  RTC prn and 3-4 months, with labs 1 week prior to the apt.  COVID 19 precautions given to patient.  Patient voices understanding of all discharge instructions.      KARY Merrill

## 2024-09-26 ENCOUNTER — TELEPHONE (OUTPATIENT)
Dept: INTERNAL MEDICINE | Facility: CLINIC | Age: 69
End: 2024-09-26
Payer: MEDICARE

## 2024-09-26 DIAGNOSIS — Q17.8 SPLIT EAR LOBE: Primary | ICD-10-CM

## 2024-09-26 NOTE — TELEPHONE ENCOUNTER
----- Message from Lisset Vasquez sent at 9/25/2024  9:40 AM CDT -----  Regarding: FW: referral  I did check with ENT and they stated that after 3 attempts to contact patient referral is closed and new one is needed.  This patient was tried to be contacted 3 separate times and never returned calls.  Please send another referral  ----- Message -----  From: Catalina Resendez  Sent: 9/25/2024   9:33 AM CDT  To: Mimi PRIEST Staff  Subject: referral                                         Who Called: Lucila Tripp    Caller is requesting assistance/information from provider's office.    Symptoms (please be specific):      How long has patient had these symptoms:      List of preferred pharmacies on file (remove unneeded): [unfilled]  If different, enter pharmacy into here including location and phone number:         Preferred Method of Contact: Phone Call  Patient's Preferred Phone Number on File: [phone]   Best Call Back Number, if different:  Additional Information: Pt stated that she needs her ENT referral sent over again; she stated that she tried scheduling an appt and was told that they would need a new referral; please advise.

## 2024-10-01 NOTE — PROGRESS NOTES
CHIEF COMPLAINT: No chief complaint on file.                                                 HPI:  Lucila Tripp 69 y.o. female with a past medical history of hypertension, hyperlipidemia, mild-to-moderate mitral regurgitation, vitamin-D deficiency, hypokalemia presents to Cardiology Clinic today to establish care.  She was referred here per PCP for evidence of mitral regurgitation on recent echocardiogram.  Patient states that overall she feels well from a cardiac standpoint.  Her main complaint today is that she has lower extremity edema in bilateral lower extremities.  She states that it was worse in her left leg and seems to be worse at the end of the day and in the early morning.  She states that the swelling is relieved with leg elevation.  Of note, patient is on amlodipine, but has not noticed any worsening since starting the medication.  Otherwise she denies any further cardiac complaints such as chest pain, SOB, LOVE, palpitations, PND, orthopnea, lightheadedness, dizziness, syncope, or claudication symptoms.  Patient does have history of sleep apnea and states that she was in need of a new CPAP.  Her prior machine was recalled and she never received a new 1.  Encouraged her to reach out to sleep clinic.  She reports following a somewhat heart healthy diet, but cholesterol is noted to be uncontrolled.  Her blood pressure is very well controlled at this time.  She reports compliance with all her current medications and is tolerating them well.  She is retired, but she tries to stay active in her day-to-day life.  Encouraged more exercise if able to.  She states that she walks all day.  She denies any tobacco or other illicit drug use.                                                                                                                                                                                                                                                                                                                                                                                                                                                                                       CARDIAC TESTING:  Results for orders placed during the hospital encounter of 07/11/24    Echo    Interpretation Summary    Left Ventricle: The left ventricle is normal in size. Normal wall thickness. There is normal systolic function. Biplane (2D) method of discs ejection fraction is 73%.    Right Ventricle: Normal right ventricular cavity size. Systolic function is normal.    Aortic Valve: The aortic valve is a trileaflet valve. There is mild aortic valve sclerosis.    Mitral Valve: There is mild to moderate regurgitation.    Pulmonary Artery: The estimated pulmonary artery systolic pressure is 26 mmHg.    IVC/SVC: Normal venous pressure at 3 mmHg.    No results found for this or any previous visit.     No results found for this or any previous visit.       Patient Active Problem List   Diagnosis    Hypertension    Hyperlipidemia    Vitamin D deficiency    Hypokalemia     Past Surgical History:   Procedure Laterality Date    CATARACT EXTRACTION Right 11/2020    CATARACT EXTRACTION Left 12/2020    COLON SURGERY      HYSTERECTOMY      NECK SURGERY      SHOULDER SURGERY      TONSILLECTOMY       Social History     Socioeconomic History    Marital status:     Number of children: 2   Occupational History    Occupation: Social Security   Tobacco Use    Smoking status: Never    Smokeless tobacco: Never   Substance and Sexual Activity    Alcohol use: Never    Drug use: Never    Sexual activity: Yes     Partners: Male     Social Drivers of Health     Financial Resource Strain: Medium Risk (5/23/2024)    Overall Financial Resource Strain (CARDIA)     Difficulty of Paying Living Expenses: Somewhat hard   Food Insecurity: Food Insecurity Present (5/23/2024)    Hunger Vital Sign     Worried About Running Out of Food in the Last Year:  Sometimes true     Ran Out of Food in the Last Year: Never true   Transportation Needs: No Transportation Needs (8/24/2023)    PRAPARE - Transportation     Lack of Transportation (Medical): No     Lack of Transportation (Non-Medical): No   Physical Activity: Inactive (5/23/2024)    Exercise Vital Sign     Days of Exercise per Week: 0 days     Minutes of Exercise per Session: 0 min   Stress: Stress Concern Present (8/24/2023)    [employer] [place] [employer] - Occupational Stress Questionnaire     Feeling of Stress : To some extent   Housing Stability: Low Risk  (5/23/2024)    Housing Stability Vital Sign     Unable to Pay for Housing in the Last Year: No     Homeless in the Last Year: No        Family History   Problem Relation Name Age of Onset    Diabetes Mother      Heart disease Mother      Hypertension Mother      Diabetes Father      Diabetes Sister      Hypertension Sister      Diabetes Sister      Hypertension Sister      Diabetes Sister       Review of patient's allergies indicates:   Allergen Reactions    Clindamycin Itching    Latex      Other reaction(s): itching         ROS:  Review of Systems   Constitutional: Negative.  Negative for malaise/fatigue.   HENT: Negative.     Eyes: Negative.    Respiratory: Negative.  Negative for shortness of breath.    Cardiovascular:  Positive for leg swelling. Negative for chest pain, orthopnea, claudication and PND.   Gastrointestinal: Negative.    Genitourinary: Negative.    Musculoskeletal: Negative.    Skin: Negative.    Neurological: Negative.  Negative for dizziness and weakness.   Endo/Heme/Allergies: Negative.    Psychiatric/Behavioral: Negative.                                                                                                                                                                                  Negative except as stated in the history of present illness. See HPI for details.    PHYSICAL EXAM:  There were no vitals taken for  this visit.    Physical Exam  Constitutional:       Appearance: Normal appearance. She is not ill-appearing.   HENT:      Head: Normocephalic.      Nose: Nose normal.   Eyes:      Pupils: Pupils are equal, round, and reactive to light.   Cardiovascular:      Rate and Rhythm: Normal rate and regular rhythm.      Pulses: Normal pulses.      Heart sounds: Normal heart sounds. No murmur heard.  Pulmonary:      Effort: Pulmonary effort is normal.   Abdominal:      General: Abdomen is flat. Bowel sounds are normal.      Palpations: Abdomen is soft.   Musculoskeletal:         General: Normal range of motion.      Cervical back: Normal range of motion.      Right lower leg: Edema present.      Left lower leg: Edema present.   Skin:     General: Skin is warm.   Neurological:      General: No focal deficit present.      Mental Status: She is alert.   Psychiatric:         Mood and Affect: Mood normal.         Current Outpatient Medications   Medication Instructions    amLODIPine (NORVASC) 10 mg, Oral, Daily    azelastine (ASTELIN) 137 mcg, Nasal, 2 times daily    blood sugar diagnostic (ONETOUCH VERIO TEST STRIPS) Strp 1 each, Misc.(Non-Drug; Combo Route), Daily, DX: E11.9 Use daily to check CBG    blood sugar diagnostic Strp 1 each, Misc.(Non-Drug; Combo Route), Daily, TRUE METRIX    blood sugar diagnostic Strp One touch Verio  test strips, See Instructions, Use dailyto check glucose  DX :DM ,code:E11.9, # 100 EA, 0 Refill(s), Pharmacy: [id number], 155, cm, Height/Length Dosing, 10/20/21 15:35:00 CDT, 73, kg, Weight Dosing, 10/20/21 15:35:00 CDT    clotrimazole-betamethasone 1-0.05% (LOTRISONE) cream Topical (Top), 2 times daily PRN    clotrimazole-betamethasone 1-0.05% (LOTRISONE) cream Topical (Top), 2 times daily    EScitalopram oxalate (LEXAPRO) 20 mg, Oral, Daily    ezetimibe (ZETIA) 10 mg, Oral, Daily    fenofibrate (TRICOR) 54 mg, Oral, Daily    fluticasone propionate (FLONASE) 50 mcg, Each Nostril, Daily     hydroCHLOROthiazide (HYDRODIURIL) 25 mg, Oral, Daily    hydrOXYzine HCL (ATARAX) 25 mg, Oral, 3 times daily PRN    lancets (ONETOUCH DELICA PLUS LANCET) 33 gauge Misc 1 lancet , Misc.(Non-Drug; Combo Route), Daily    lancets 33 gauge Misc Misc.(Non-Drug; Combo Route)    lancets Misc   One Touch Delica Lancets 33g, See Instructions, Use daily to check glucose  Dx DM: code:E11.9, # 100 EA, 0 Refill(s), Pharmacy: [id number], 155, cm, Height/Length Dosing, 10/20/21 15:35:00 CDT, 73, kg, Weight Dosing, 10/20/21 15:35:00 CDT    LATUDA 40 mg Tab tablet 1 tablet, Oral, Daily    levocetirizine (XYZAL) 5 mg, Oral, Nightly    meloxicam (MOBIC) 7.5 mg, Oral, Daily    mirtazapine (REMERON) 30 mg, Oral, Nightly    mupirocin (BACTROBAN) 2 % ointment Topical (Top), 2 times daily    ONETOUCH DELICA PLUS LANCET 33 gauge Misc USE 1 LANCET TO CHECK GLUCOSE ONCE DAILY    potassium chloride SA (K-DUR,KLOR-CON) 20 MEQ tablet 20 mEq, Oral, 2 times daily    prazosin (MINIPRESS) 5 mg, Oral, Nightly    pregabalin (LYRICA) 150 mg, 2 times daily    pregabalin (LYRICA) 150 mg, Oral, 2 times daily    tiZANidine (ZANAFLEX) 4 mg, Oral, Nightly PRN    traMADoL (ULTRAM) 50 mg, 2 times daily PRN    traZODone (DESYREL) 100 mg, Oral, Nightly    triamcinolone acetonide 0.1% (KENALOG) 0.1 % cream Topical (Top), 3 times daily PRN    triamcinolone acetonide 0.1% (KENALOG) 0.1 % ointment   See Instructions, TOP TID to affected area prn rash, # 60 gm, 1 Refill(s), Pharmacy: [id number], 155, cm, Height/Length Dosing, 10/20/21 13:35:00 CDT, 73.3, kg, Weight Dosing, 10/20/21 13:35:00 CDT        All medications, laboratory studies, cardiac diagnostic imaging reviewed.     Lab Results   Component Value Date    .00 (H) 08/22/2024    .00 05/23/2024    TRIG 237 (H) 08/22/2024    TRIG 165 (H) 05/23/2024    CREATININE 0.61 08/22/2024    MG 1.90 09/06/2023    K 3.5 08/22/2024        ASSESSMENT/PLAN:    Mild to Moderate Mitral  Regurgitation  Mostly asymptomatic   Complaining of some lower extremity edema, but states that it was relieved with leg elevation   We will continue to monitor with serial echoes every 1-2 years, unless symptoms indicate otherwise     Hypertension   BP well controlled today 126/69   Continue current medications as listed above   Counseled on importance of following a low-sodium, heart healthy diet and exercise as tolerated    Hyperlipidemia   , triglycerides 237, HDL 54, total cholesterol 244 per labs August of 2024   She was currently taking fenofibrate   Will add atorvastatin 20 mg daily   CMP in 1 week   Counseled on importance of following a low-cholesterol, low-fat diet and exercise as tolerated    Lower Extremity Edema  Reports bilateral lower extremity edema that occurs most days of the week   She states that it was typically worse in left leg than when right   She was no recent ultrasounds   Will order venous insufficiency ultrasounds to assess for any underlying venous disease   Counseled on compression stockings, leg elevation, and low-sodium diet   Patient was on amlodipine, but states that she did not notice any worsening or improvement when being on medication versus off      EKG today   Complete venous insufficiency ultrasounds of bilateral lower extremities  Start taking atorvastatin 20 mg daily   Complete CMP in 1 week  Follow up in cardiology clinic in 3 months or sooner if needed   Follow up PCP as directed   Please clinic if any concerns or any change symptoms

## 2024-10-02 ENCOUNTER — OFFICE VISIT (OUTPATIENT)
Dept: CARDIOLOGY | Facility: CLINIC | Age: 69
End: 2024-10-02
Payer: MEDICARE

## 2024-10-02 VITALS
RESPIRATION RATE: 18 BRPM | DIASTOLIC BLOOD PRESSURE: 69 MMHG | OXYGEN SATURATION: 95 % | TEMPERATURE: 99 F | HEIGHT: 63 IN | HEART RATE: 68 BPM | WEIGHT: 156.75 LBS | SYSTOLIC BLOOD PRESSURE: 126 MMHG | BODY MASS INDEX: 27.77 KG/M2

## 2024-10-02 DIAGNOSIS — E78.5 HYPERLIPIDEMIA, UNSPECIFIED HYPERLIPIDEMIA TYPE: ICD-10-CM

## 2024-10-02 DIAGNOSIS — R60.9 EDEMA, UNSPECIFIED TYPE: ICD-10-CM

## 2024-10-02 DIAGNOSIS — I34.0 MITRAL VALVE INSUFFICIENCY, UNSPECIFIED ETIOLOGY: ICD-10-CM

## 2024-10-02 DIAGNOSIS — I10 HYPERTENSION, UNSPECIFIED TYPE: ICD-10-CM

## 2024-10-02 DIAGNOSIS — R60.0 BILATERAL LOWER EXTREMITY EDEMA: Primary | ICD-10-CM

## 2024-10-02 LAB
OHS QRS DURATION: 80 MS
OHS QTC CALCULATION: 419 MS

## 2024-10-02 PROCEDURE — 3074F SYST BP LT 130 MM HG: CPT | Mod: CPTII,,,

## 2024-10-02 PROCEDURE — 1159F MED LIST DOCD IN RCRD: CPT | Mod: CPTII,,,

## 2024-10-02 PROCEDURE — 93005 ELECTROCARDIOGRAM TRACING: CPT

## 2024-10-02 PROCEDURE — 1160F RVW MEDS BY RX/DR IN RCRD: CPT | Mod: CPTII,,,

## 2024-10-02 PROCEDURE — 99215 OFFICE O/P EST HI 40 MIN: CPT | Mod: PBBFAC

## 2024-10-02 PROCEDURE — 3008F BODY MASS INDEX DOCD: CPT | Mod: CPTII,,,

## 2024-10-02 PROCEDURE — 99204 OFFICE O/P NEW MOD 45 MIN: CPT | Mod: S$PBB,,,

## 2024-10-02 PROCEDURE — 1101F PT FALLS ASSESS-DOCD LE1/YR: CPT | Mod: CPTII,,,

## 2024-10-02 PROCEDURE — 3078F DIAST BP <80 MM HG: CPT | Mod: CPTII,,,

## 2024-10-02 PROCEDURE — 3288F FALL RISK ASSESSMENT DOCD: CPT | Mod: CPTII,,,

## 2024-10-02 PROCEDURE — 3044F HG A1C LEVEL LT 7.0%: CPT | Mod: CPTII,,,

## 2024-10-02 RX ORDER — ATORVASTATIN CALCIUM 20 MG/1
20 TABLET, FILM COATED ORAL DAILY
Qty: 90 TABLET | Refills: 3 | Status: SHIPPED | OUTPATIENT
Start: 2024-10-02 | End: 2025-10-02

## 2024-10-02 NOTE — PATIENT INSTRUCTIONS
EKG today   Complete venous insufficiency ultrasounds of bilateral lower extremities  Start taking atorvastatin 20 mg daily   Complete CMP in 1 week  Follow up in cardiology clinic in 3 months or sooner if needed   Follow up PCP as directed   Please clinic if any concerns or any change symptoms

## 2024-10-07 ENCOUNTER — HOSPITAL ENCOUNTER (OUTPATIENT)
Dept: RADIOLOGY | Facility: HOSPITAL | Age: 69
Discharge: HOME | End: 2024-10-07
Attending: NURSE PRACTITIONER
Payer: MEDICARE

## 2024-10-07 DIAGNOSIS — E04.2 MULTINODULAR THYROID: ICD-10-CM

## 2024-10-07 PROCEDURE — 76536 US EXAM OF HEAD AND NECK: CPT | Mod: TC

## 2024-10-20 ENCOUNTER — TELEPHONE (OUTPATIENT)
Dept: INTERNAL MEDICINE | Facility: CLINIC | Age: 69
End: 2024-10-20
Payer: MEDICARE

## 2024-10-20 DIAGNOSIS — E04.1 RIGHT THYROID NODULE: ICD-10-CM

## 2024-10-20 NOTE — TELEPHONE ENCOUNTER
----- Message from Nurse Flores sent at 10/16/2024  4:19 PM CDT -----    ----- Message -----  From: Denae Hills  Sent: 10/16/2024   3:32 PM CDT  To: Mimi PRIEST Staff    .Who Called: Lucila Tripp    Caller is requesting information on test results.    Name of Test (Lab/Mammo/Etc): THYROID US     Date of Test:  10/07/2024    Where the test was performed: [facility]    Ordering Provider: Ludy Le    Preferred Method of Contact: Phone Call    Patient's Preferred Phone Number on File: [phone]     Best Call Back Number, if different:    Additional Information: n/a

## 2024-10-23 ENCOUNTER — TELEPHONE (OUTPATIENT)
Dept: INTERNAL MEDICINE | Facility: CLINIC | Age: 69
End: 2024-10-23
Payer: MEDICARE

## 2024-10-23 ENCOUNTER — TELEPHONE (OUTPATIENT)
Dept: INTERVENTIONAL RADIOLOGY/VASCULAR | Facility: HOSPITAL | Age: 69
End: 2024-10-23
Payer: MEDICARE

## 2024-10-30 ENCOUNTER — HOSPITAL ENCOUNTER (OUTPATIENT)
Dept: INTERVENTIONAL RADIOLOGY/VASCULAR | Facility: HOSPITAL | Age: 69
Discharge: HOME | End: 2024-10-30
Attending: NURSE PRACTITIONER
Payer: MEDICARE

## 2024-10-30 DIAGNOSIS — E04.1 THYROID NODULE: ICD-10-CM

## 2024-10-30 PROCEDURE — 10005 FNA BX W/US GDN 1ST LES: CPT

## 2024-10-30 PROCEDURE — 88173 CYTOPATH EVAL FNA REPORT: CPT | Mod: TC | Performed by: NURSE PRACTITIONER

## 2024-10-31 ENCOUNTER — TELEPHONE (OUTPATIENT)
Dept: INTERNAL MEDICINE | Facility: CLINIC | Age: 69
End: 2024-10-31
Payer: MEDICARE

## 2024-10-31 LAB
ESTROGEN SERPL-MCNC: NORMAL PG/ML
INSULIN SERPL-ACNC: NORMAL U[IU]/ML
LAB AP CLINICAL INFORMATION: NORMAL
LAB AP COMMENTS: NORMAL
LAB AP GROSS DESCRIPTION: NORMAL
LAB AP NON-GYN INTERPRETATION SPECIMEN 1: NORMAL
LAB AP PATHOLOGIST ADEQUACY INTERP: NORMAL

## 2024-11-13 ENCOUNTER — TELEPHONE (OUTPATIENT)
Dept: INTERNAL MEDICINE | Facility: CLINIC | Age: 69
End: 2024-11-13
Payer: MEDICARE

## 2024-11-13 NOTE — TELEPHONE ENCOUNTER
----- Message from Catalina sent at 11/13/2024  9:35 AM CST -----  Regarding: refill  Who Called: Lucila Tripp    Refill or New Rx:Refill  RX Name and Strength:ointment for her back--pt could not provide the name of the medication      How is the patient currently taking it? (ex. 1XDay):    Is this a 30 day or 90 day RX:    Local or Mail Order:local    List of preferred pharmacies on file (remove unneeded): [id number] - [place] [address] [place]   Phone: [phone]  Fax: [phone]        Ordering Provider:        Preferred Method of Contact: Phone Call  Patient's Preferred Phone Number on File: [phone]   Best Call Back Number, if different:  Additional Information: Pt states that she needs a refill on a cream for her back; she stated that she doesn't know the name of the medication; please advise. Thanks.

## 2024-11-25 RX ORDER — CLOTRIMAZOLE AND BETAMETHASONE DIPROPIONATE 10; .64 MG/G; MG/G
CREAM TOPICAL
Qty: 45 G | Refills: 0 | Status: SHIPPED | OUTPATIENT
Start: 2024-11-25

## 2024-11-27 ENCOUNTER — HOSPITAL ENCOUNTER (OUTPATIENT)
Dept: RADIOLOGY | Facility: HOSPITAL | Age: 69
Discharge: HOME | End: 2024-11-27
Payer: MEDICARE

## 2024-11-27 DIAGNOSIS — R60.9 EDEMA, UNSPECIFIED TYPE: ICD-10-CM

## 2024-11-27 DIAGNOSIS — R60.0 BILATERAL LOWER EXTREMITY EDEMA: ICD-10-CM

## 2024-11-27 PROCEDURE — 93970 EXTREMITY STUDY: CPT

## 2024-12-06 ENCOUNTER — TELEPHONE (OUTPATIENT)
Dept: INTERNAL MEDICINE | Facility: CLINIC | Age: 69
End: 2024-12-06
Payer: MEDICARE

## 2024-12-06 NOTE — TELEPHONE ENCOUNTER
----- Message from KARY Barrientos sent at 12/4/2024  5:17 PM CST -----  Please inform pt of normal venous ultrasound of her legs. No blood clots.

## 2024-12-06 NOTE — TELEPHONE ENCOUNTER
Attempted to contact pt to inform her of PCP's message below. Pt did not answer. LVM to return call to [facility].                -- Message from KARY Barrientos sent at 12/4/2024  5:17 PM CST -----  Please inform pt of normal venous ultrasound of her legs. No blood clots.

## 2024-12-18 ENCOUNTER — HOSPITAL ENCOUNTER (EMERGENCY)
Facility: HOSPITAL | Age: 69
Discharge: HOME | End: 2024-12-18
Attending: EMERGENCY MEDICINE
Payer: MEDICARE

## 2024-12-18 VITALS
OXYGEN SATURATION: 100 % | RESPIRATION RATE: 16 BRPM | SYSTOLIC BLOOD PRESSURE: 133 MMHG | TEMPERATURE: 97 F | HEART RATE: 70 BPM | BODY MASS INDEX: 27.64 KG/M2 | HEIGHT: 63 IN | DIASTOLIC BLOOD PRESSURE: 78 MMHG | WEIGHT: 156 LBS

## 2024-12-18 DIAGNOSIS — R21 RASH AND NONSPECIFIC SKIN ERUPTION: Primary | ICD-10-CM

## 2024-12-18 PROCEDURE — 63600175 PHARM REV CODE 636 W HCPCS: Performed by: NURSE PRACTITIONER

## 2024-12-18 PROCEDURE — 99284 EMERGENCY DEPT VISIT MOD MDM: CPT | Mod: 25

## 2024-12-18 PROCEDURE — 96372 THER/PROPH/DIAG INJ SC/IM: CPT | Performed by: NURSE PRACTITIONER

## 2024-12-18 RX ORDER — HYDROXYZINE HYDROCHLORIDE 25 MG/1
25 TABLET, FILM COATED ORAL 3 TIMES DAILY PRN
Qty: 21 TABLET | Refills: 0 | Status: SHIPPED | OUTPATIENT
Start: 2024-12-18 | End: 2024-12-25

## 2024-12-18 RX ORDER — DEXAMETHASONE SODIUM PHOSPHATE 4 MG/ML
8 INJECTION, SOLUTION INTRA-ARTICULAR; INTRALESIONAL; INTRAMUSCULAR; INTRAVENOUS; SOFT TISSUE
Status: COMPLETED | OUTPATIENT
Start: 2024-12-18 | End: 2024-12-18

## 2024-12-18 RX ORDER — PREDNISONE 10 MG/1
20 TABLET ORAL
Status: DISCONTINUED | OUTPATIENT
Start: 2024-12-18 | End: 2024-12-18

## 2024-12-18 RX ORDER — TRIAMCINOLONE ACETONIDE 1 MG/G
CREAM TOPICAL 2 TIMES DAILY
Qty: 28.4 G | Refills: 0 | Status: SHIPPED | OUTPATIENT
Start: 2024-12-18

## 2024-12-18 RX ADMIN — DEXAMETHASONE SODIUM PHOSPHATE 8 MG: 4 INJECTION, SOLUTION INTRA-ARTICULAR; INTRALESIONAL; INTRAMUSCULAR; INTRAVENOUS; SOFT TISSUE at 09:12

## 2024-12-18 NOTE — ED PROVIDER NOTES
Encounter Date: 12/18/2024       History     Chief Complaint   Patient presents with    Rash     Rash to left upper abdomen x 2 days with itching.      Pt is a 69-year-old female who presents for evaluation of a rash to her left upper abdomen.  Reports symptoms x2 days.  Denies swelling to lips/tongue, changes in soaps or shampoo, chest pain, shortness of breath, or fever.  Patient with history of hypertension and anxiety.  Reports full compliance without medication.      Review of patient's allergies indicates:   Allergen Reactions    Clindamycin Itching    Latex      Other reaction(s): itching     Past Medical History:   Diagnosis Date    Anxiety     Hyperlipidemia     Hypertension      Past Surgical History:   Procedure Laterality Date    CATARACT EXTRACTION Right 11/2020    CATARACT EXTRACTION Left 12/2020    COLON SURGERY      HYSTERECTOMY      NECK SURGERY      SHOULDER SURGERY      TONSILLECTOMY       Family History   Problem Relation Name Age of Onset    Diabetes Mother      Heart disease Mother      Hypertension Mother      Diabetes Father      Diabetes Sister      Hypertension Sister      Diabetes Sister      Hypertension Sister      Diabetes Sister       Social History     Tobacco Use    Smoking status: Never    Smokeless tobacco: Never   Substance Use Topics    Alcohol use: Never    Drug use: Never     Review of Systems   Constitutional:  Negative for chills, diaphoresis, fatigue and fever.   HENT:  Negative for facial swelling, rhinorrhea, sinus pressure, sinus pain, sore throat and trouble swallowing.    Respiratory:  Negative for cough, chest tightness, shortness of breath and wheezing.    Cardiovascular:  Negative for chest pain, palpitations and leg swelling.   Gastrointestinal:  Negative for abdominal pain, diarrhea, nausea and vomiting.   Genitourinary:  Negative for dysuria, flank pain, frequency, hematuria and urgency.   Musculoskeletal:  Negative for arthralgias, back pain, joint swelling and  myalgias.   Skin:  Positive for rash. Negative for color change.   Neurological:  Negative for dizziness, syncope, weakness and light-headedness.   Hematological:  Does not bruise/bleed easily.   All other systems reviewed and are negative.      Physical Exam     Initial Vitals [12/18/24 0904]   BP Pulse Resp Temp SpO2   133/78 70 16 97.3 °F (36.3 °C) 100 %      MAP       --         Physical Exam    Nursing note and vitals reviewed.  Constitutional: She appears well-developed and well-nourished.   HENT:   Head: Normocephalic and atraumatic.   Nose: Nose normal. Mouth/Throat: Oropharynx is clear and moist.   Eyes: Conjunctivae and EOM are normal. Pupils are equal, round, and reactive to light.   Neck: Neck supple.   Normal range of motion.  Cardiovascular:  Normal rate, regular rhythm, normal heart sounds and intact distal pulses.           Pulmonary/Chest: Effort normal and breath sounds normal. No respiratory distress. She has no wheezes. She has no rhonchi. She has no rales. She exhibits no tenderness.   Abdominal: Abdomen is soft and flat. Bowel sounds are normal. She exhibits no distension. There is no abdominal tenderness. There is no rebound, no guarding, no tenderness at McBurney's point and negative Stallings's sign. negative psoas sign  Musculoskeletal:         General: Normal range of motion.      Cervical back: Normal range of motion and neck supple.     Neurological: She is alert and oriented to person, place, and time. She has normal strength and normal reflexes.   Skin: Skin is warm and dry. Capillary refill takes less than 2 seconds. Rash noted. Rash is not vesicular.        Psychiatric: She has a normal mood and affect. Her speech is normal and behavior is normal. Judgment and thought content normal.         ED Course   Procedures  Labs Reviewed - No data to display       Imaging Results    None          Medications   dexAMETHasone injection 8 mg (8 mg Intramuscular Given 12/18/24 0939)     Medical  Decision Making  Differential:   Allergic reaction   Skin rash   Dermatitis       Risk  Prescription drug management.               ED Course as of 12/18/24 0943   Wed Dec 18, 2024   0938 Given strict ED return precautions. I have spoken with the patient and/or caregivers. I have explained the patient's condition, diagnoses and treatment plan based on the information available to me at this time. I have answered the patient's and/or caregiver's questions and addressed any concerns. The patient and/or caregivers have as good an understanding of the patient's diagnosis, condition and treatment plan as can be expected at this point. The vital signs have been stable. The patient's condition is stable and appropriate for discharge from the emergency department.      The patient will pursue further outpatient evaluation with the primary care physician or other designated or consulting physician as outlined in the discharge instructions. The patient and/or caregivers are agreeable to this plan of care and follow-up instructions have been explained in detail. The patient and/or caregivers have received these instructions in written format and have expressed an understanding of the discharge instructions. The patient and/or caregivers are aware that any significant change in condition or worsening of symptoms should prompt an immediate return to this or the closest emergency department or a call to 911.   [JA]      ED Course User Index  [JA] Pito Torres Jr., FNP                           Clinical Impression:  Final diagnoses:  [R21] Rash and nonspecific skin eruption (Primary)          ED Disposition Condition    Discharge Stable          ED Prescriptions       Medication Sig Dispense Start Date End Date Auth. Provider    triamcinolone acetonide 0.1% (KENALOG) 0.1 % cream Apply topically 2 (two) times daily. 28.4 g 12/18/2024 -- Pito Torres Jr., KARY    hydrOXYzine HCL (ATARAX) 25 MG tablet Take 1 tablet (25 mg  total) by mouth 3 (three) times daily as needed for Itching. 21 tablet 12/18/2024 12/25/2024 Pito Torres Jr., [facility]          Follow-up Information       Follow up With Specialties Details Why Contact Info    Ludy Le, [facility] In 3 days  [address] [facility] [address]  [phone]      [facility] Emergency Medicine In 3 days As needed, If symptoms worsen [address] [facility] [id number] [phone]             Pito Torres Jr., KARY  12/18/24 9241

## 2024-12-23 RX ORDER — CLOTRIMAZOLE AND BETAMETHASONE DIPROPIONATE 10; .64 MG/G; MG/G
CREAM TOPICAL 2 TIMES DAILY
Qty: 45 G | Refills: 0 | Status: SHIPPED | OUTPATIENT
Start: 2024-12-23

## 2024-12-23 NOTE — TELEPHONE ENCOUNTER
Pharmacy requesting refill for pt's  clotrimazole-betamethasone 1-0.05% (LOTRISONE) cream     LOV:  8/26/24    NOV: 3/11/24

## 2025-01-14 ENCOUNTER — OFFICE VISIT (OUTPATIENT)
Dept: CARDIOLOGY | Facility: CLINIC | Age: 70
End: 2025-01-14
Payer: MEDICARE

## 2025-01-14 VITALS
WEIGHT: 157 LBS | SYSTOLIC BLOOD PRESSURE: 134 MMHG | DIASTOLIC BLOOD PRESSURE: 74 MMHG | BODY MASS INDEX: 28.89 KG/M2 | TEMPERATURE: 98 F | OXYGEN SATURATION: 98 % | RESPIRATION RATE: 18 BRPM | HEART RATE: 86 BPM | HEIGHT: 62 IN

## 2025-01-14 DIAGNOSIS — I34.0 MITRAL VALVE INSUFFICIENCY, UNSPECIFIED ETIOLOGY: ICD-10-CM

## 2025-01-14 DIAGNOSIS — I10 HYPERTENSION, UNSPECIFIED TYPE: Primary | ICD-10-CM

## 2025-01-14 DIAGNOSIS — E78.5 HYPERLIPIDEMIA, UNSPECIFIED HYPERLIPIDEMIA TYPE: ICD-10-CM

## 2025-01-14 PROCEDURE — 3078F DIAST BP <80 MM HG: CPT | Mod: CPTII,,,

## 2025-01-14 PROCEDURE — 3288F FALL RISK ASSESSMENT DOCD: CPT | Mod: CPTII,,,

## 2025-01-14 PROCEDURE — 1126F AMNT PAIN NOTED NONE PRSNT: CPT | Mod: CPTII,,,

## 2025-01-14 PROCEDURE — 99215 OFFICE O/P EST HI 40 MIN: CPT | Mod: PBBFAC

## 2025-01-14 PROCEDURE — 3075F SYST BP GE 130 - 139MM HG: CPT | Mod: CPTII,,,

## 2025-01-14 PROCEDURE — 1159F MED LIST DOCD IN RCRD: CPT | Mod: CPTII,,,

## 2025-01-14 PROCEDURE — 1101F PT FALLS ASSESS-DOCD LE1/YR: CPT | Mod: CPTII,,,

## 2025-01-14 PROCEDURE — 3008F BODY MASS INDEX DOCD: CPT | Mod: CPTII,,,

## 2025-01-14 PROCEDURE — 1160F RVW MEDS BY RX/DR IN RCRD: CPT | Mod: CPTII,,,

## 2025-01-14 PROCEDURE — 99214 OFFICE O/P EST MOD 30 MIN: CPT | Mod: S$PBB,,,

## 2025-01-14 NOTE — PATIENT INSTRUCTIONS
Complete labs   Follow up in Cardiology Clinic in 6 months or sooner if needed  Follow up with PCP as directed

## 2025-01-14 NOTE — PROGRESS NOTES
CHIEF COMPLAINT: No chief complaint on file.                                                 HPI:  Lucila Sharma 69 y.o. female with a past medical history of hypertension, hyperlipidemia, mild-to-moderate mitral regurgitation, vitamin-D deficiency, hypokalemia presents to Cardiology Clinic today for follow up and ongoing care.  She was referred here per PCP for evidence of mitral regurgitation on recent echocardiogram.  Patient stated that overall she feels well from a cardiac standpoint.  Her main complaint at that she was lower extremity edema in bilateral lower extremities.  She completed ultrasounds which were unremarkable.  Since then she states that the swelling has improved with leg elevation.  Updated cholesterol lab work was ordered at last visit, however patient did not complete.  She will complete tomorrow she says.    Today the patient states that she feels well cardiac standpoint.  She denies any cardiac complaints today such as chest pain, SOB, palpitations, PND, orthopnea, lightheadedness, dizziness, syncope, PND, orthopnea, or claudication symptoms.  She reports some improvement in her lower extremity edema with leg elevation.  She has a history of sleep apnea and has been without a CPAP.  Encouraged her to reach out to PCP for assistance with this.  She may also call the medical device store, [employer], where she initially obtained the machine.  She reports following a somewhat heart healthy diet, but admits that she did not follow a healthy diet over the holidays.  Her blood pressure is well-controlled at time.  Needs updated lipid panel.  She states that she is retired but she tries to stay active in her daily life and she states that she frequently walks and stretches for exercise.  She denies any tobacco or other illicit drug use.                                                                                                                                                                                                                                                                                                     CARDIAC TESTING:  Results for orders placed during the hospital encounter of 07/11/24    Echo    Interpretation Summary    Left Ventricle: The left ventricle is normal in size. Normal wall thickness. There is normal systolic function. Biplane (2D) method of discs ejection fraction is 73%.    Right Ventricle: Normal right ventricular cavity size. Systolic function is normal.    Aortic Valve: The aortic valve is a trileaflet valve. There is mild aortic valve sclerosis.    Mitral Valve: There is mild to moderate regurgitation.    Pulmonary Artery: The estimated pulmonary artery systolic pressure is 26 mmHg.    IVC/SVC: Normal venous pressure at 3 mmHg.    No results found for this or any previous visit.     No results found for this or any previous visit.       Patient Active Problem List   Diagnosis    Hypertension    Hyperlipidemia    Vitamin D deficiency    Hypokalemia     Past Surgical History:   Procedure Laterality Date    CATARACT EXTRACTION Right 11/2020    CATARACT EXTRACTION Left 12/2020    COLON SURGERY      HYSTERECTOMY      NECK SURGERY      SHOULDER SURGERY      TONSILLECTOMY       Social History     Socioeconomic History    Marital status:     Number of children: 2   Occupational History    Occupation: Social Security   Tobacco Use    Smoking status: Never    Smokeless tobacco: Never   Substance and Sexual Activity    Alcohol use: Never    Drug use: Never    Sexual activity: Yes     Partners: Male     Social Drivers of Health     Financial Resource Strain: Medium Risk (5/23/2024)    Overall Financial Resource Strain (CARDIA)     Difficulty of Paying Living Expenses: Somewhat hard   Food Insecurity: Food Insecurity Present (5/23/2024)    Hunger Vital Sign     Worried About Running Out of Food in the Last Year: Sometimes true     Ran Out of Food in the Last Year: Never true    Transportation Needs: No Transportation Needs (8/24/2023)    PRAPARE - Transportation     Lack of Transportation (Medical): No     Lack of Transportation (Non-Medical): No   Physical Activity: Inactive (5/23/2024)    Exercise Vital Sign     Days of Exercise per Week: 0 days     Minutes of Exercise per Session: 0 min   Stress: Stress Concern Present (8/24/2023)    [employer] - Occupational Stress Questionnaire     Feeling of Stress : To some extent   Housing Stability: Low Risk  (5/23/2024)    Housing Stability Vital Sign     Unable to Pay for Housing in the Last Year: No     Homeless in the Last Year: No        Family History   Problem Relation Name Age of Onset    Diabetes Mother      Heart disease Mother      Hypertension Mother      Diabetes Father      Diabetes Sister      Hypertension Sister      Diabetes Sister      Hypertension Sister      Diabetes Sister       Review of patient's allergies indicates:   Allergen Reactions    Clindamycin Itching    Latex      Other reaction(s): itching         ROS:  Review of Systems   Constitutional: Negative.  Negative for malaise/fatigue.   HENT: Negative.     Eyes: Negative.    Respiratory: Negative.  Negative for shortness of breath.    Cardiovascular:  Positive for leg swelling (Mild). Negative for chest pain, orthopnea, claudication and PND.   Gastrointestinal: Negative.    Genitourinary: Negative.    Musculoskeletal: Negative.    Skin: Negative.    Neurological: Negative.  Negative for dizziness and weakness.   Endo/Heme/Allergies: Negative.    Psychiatric/Behavioral: Negative.                                                                                                                                                                                  Negative except as stated in the history of present illness. See HPI for details.    PHYSICAL EXAM:  There were no vitals taken for this visit.    Physical Exam  Constitutional:        Appearance: Normal appearance. She is not ill-appearing.   HENT:      Head: Normocephalic.      Nose: Nose normal.   Eyes:      Pupils: Pupils are equal, round, and reactive to light.   Cardiovascular:      Rate and Rhythm: Normal rate and regular rhythm.      Pulses: Normal pulses.      Heart sounds: Normal heart sounds. No murmur heard.  Pulmonary:      Effort: Pulmonary effort is normal.   Abdominal:      General: Abdomen is flat. Bowel sounds are normal.      Palpations: Abdomen is soft.   Musculoskeletal:         General: Normal range of motion.      Cervical back: Normal range of motion.      Right lower leg: Edema (Mild) present.      Left lower leg: Edema (Mild) present.   Skin:     General: Skin is warm.   Neurological:      General: No focal deficit present.      Mental Status: She is alert.   Psychiatric:         Mood and Affect: Mood normal.         Current Outpatient Medications   Medication Instructions    amLODIPine (NORVASC) 10 mg, Oral, Daily    atorvastatin (LIPITOR) 20 mg, Oral, Daily    azelastine (ASTELIN) 137 mcg, Nasal, 2 times daily    blood sugar diagnostic (ONETOUCH VERIO TEST STRIPS) Strp 1 each, Misc.(Non-Drug; Combo Route), Daily, DX: E11.9 Use daily to check CBG    blood sugar diagnostic Strp 1 each, Misc.(Non-Drug; Combo Route), Daily, TRUE METRIX    blood sugar diagnostic Strp One touch Verio  test strips, See Instructions, Use dailyto check glucose  DX :DM ,code:E11.9, # 100 EA, 0 Refill(s), Pharmacy: [id number], 155, cm, Height/Length Dosing, 10/20/21 15:35:00 CDT, 73, kg, Weight Dosing, 10/20/21 15:35:00 CDT    clotrimazole-betamethasone 1-0.05% (LOTRISONE) cream Topical (Top), 2 times daily PRN    clotrimazole-betamethasone 1-0.05% (LOTRISONE) cream Topical (Top), 2 times daily    EScitalopram oxalate (LEXAPRO) 20 mg, Daily    ezetimibe (ZETIA) 10 mg, Oral, Daily    fenofibrate (TRICOR) 54 mg, Oral, Daily    fluticasone propionate (FLONASE) 50 mcg, Each Nostril, Daily     hydroCHLOROthiazide (HYDRODIURIL) 25 mg, Oral, Daily    lancets (ONETOUCH DELICA PLUS LANCET) 33 gauge Misc 1 lancet , Misc.(Non-Drug; Combo Route), Daily    lancets 33 gauge Misc by Misc.(Non-Drug; Combo Route) route.    lancets Misc   One Touch Delica Lancets 33g, See Instructions, Use daily to check glucose  Dx DM: code:E11.9, # 100 EA, 0 Refill(s), Pharmacy: [id number], 155, cm, Height/Length Dosing, 10/20/21 15:35:00 CDT, 73, kg, Weight Dosing, 10/20/21 15:35:00 CDT    LATUDA 40 mg Tab tablet 1 tablet, Daily    levocetirizine (XYZAL) 5 mg, Oral, Nightly    meloxicam (MOBIC) 7.5 mg, Oral, Daily    mirtazapine (REMERON) 30 mg, Nightly    mupirocin (BACTROBAN) 2 % ointment Topical (Top), 2 times daily    ONETOUCH DELICA PLUS LANCET 33 gauge Misc USE 1 LANCET TO CHECK GLUCOSE ONCE DAILY    potassium chloride SA (K-DUR,KLOR-CON) 20 MEQ tablet 20 mEq, Oral, 2 times daily    prazosin (MINIPRESS) 5 mg, Nightly    pregabalin (LYRICA) 150 mg, 2 times daily    pregabalin (LYRICA) 150 mg, 2 times daily    tiZANidine (ZANAFLEX) 4 mg, Oral, Nightly PRN    traMADoL (ULTRAM) 50 mg, 2 times daily PRN    traZODone (DESYREL) 100 mg, Nightly    triamcinolone acetonide 0.1% (KENALOG) 0.1 % cream Topical (Top), 2 times daily        All medications, laboratory studies, cardiac diagnostic imaging reviewed.     Lab Results   Component Value Date    .00 (H) 08/22/2024    .00 05/23/2024    TRIG 237 (H) 08/22/2024    TRIG 165 (H) 05/23/2024    CREATININE 0.77 10/07/2024    MG 1.90 09/06/2023    K 3.7 10/07/2024        ASSESSMENT/PLAN:    Mild to Moderate Mitral Regurgitation  Mostly asymptomatic   Previously complained of leg swelling that is relieved with leg elevation- denies any symptoms today   We will continue to monitor with serial echoes every 1-2 years, unless symptoms indicate otherwise   Most recent Echo July 2024- mild to moderate MR - see full report above     Hypertension   BP well controlled today  134/74  Continue current medications as listed above   Counseled on importance of following a low-sodium, heart healthy diet and exercise as tolerated    Hyperlipidemia   , triglycerides 237, HDL 54, total cholesterol 244 per labs August of 2024   She was currently taking fenofibrate   Continue Atorvastatin 20 mg daily and Zetia  CMP/FLP ordered   Counseled on importance of following a low-cholesterol, low-fat diet and exercise as tolerated    Lower Extremity Edema  Improved from initial visit   Reports bilateral lower extremity edema that occurs most days of the week   She states that it was typically worse in left leg than when right   Venous US unremarkable   Counseled on compression stockings, leg elevation, and low-sodium diet   Patient was on amlodipine, but states that she did not notice any worsening or improvement when being on medication versus off      Complete labs   Follow up in Cardiology Clinic in 6 months or sooner if needed  Follow up with PCP as directed

## 2025-01-15 ENCOUNTER — TELEPHONE (OUTPATIENT)
Dept: INTERNAL MEDICINE | Facility: CLINIC | Age: 70
End: 2025-01-15
Payer: MEDICARE

## 2025-01-15 ENCOUNTER — LAB VISIT (OUTPATIENT)
Dept: LAB | Facility: HOSPITAL | Age: 70
End: 2025-01-15
Attending: NURSE PRACTITIONER
Payer: MEDICARE

## 2025-01-15 DIAGNOSIS — E78.5 HYPERLIPIDEMIA, UNSPECIFIED HYPERLIPIDEMIA TYPE: ICD-10-CM

## 2025-01-15 DIAGNOSIS — R73.03 PREDIABETES: ICD-10-CM

## 2025-01-15 DIAGNOSIS — I10 HYPERTENSION, UNSPECIFIED TYPE: ICD-10-CM

## 2025-01-15 DIAGNOSIS — E55.9 VITAMIN D DEFICIENCY: ICD-10-CM

## 2025-01-15 LAB
25(OH)D3+25(OH)D2 SERPL-MCNC: 39 NG/ML (ref 30–80)
ALBUMIN SERPL-MCNC: 3.9 G/DL (ref 3.4–4.8)
ALBUMIN/GLOB SERPL: 1.1 RATIO (ref 1.1–2)
ALP SERPL-CCNC: 83 UNIT/L (ref 40–150)
ALT SERPL-CCNC: 17 UNIT/L (ref 0–55)
ANION GAP SERPL CALC-SCNC: 6 MEQ/L
AST SERPL-CCNC: 19 UNIT/L (ref 5–34)
BASOPHILS # BLD AUTO: 0.03 X10(3)/MCL
BASOPHILS NFR BLD AUTO: 0.4 %
BILIRUB SERPL-MCNC: 0.3 MG/DL
BUN SERPL-MCNC: 16.4 MG/DL (ref 9.8–20.1)
CALCIUM SERPL-MCNC: 9.2 MG/DL (ref 8.4–10.2)
CHLORIDE SERPL-SCNC: 111 MMOL/L (ref 98–107)
CHOLEST SERPL-MCNC: 125 MG/DL
CHOLEST/HDLC SERPL: 2 {RATIO} (ref 0–5)
CO2 SERPL-SCNC: 26 MMOL/L (ref 23–31)
CREAT SERPL-MCNC: 0.73 MG/DL (ref 0.55–1.02)
CREAT/UREA NIT SERPL: 22
EOSINOPHIL # BLD AUTO: 0.13 X10(3)/MCL (ref 0–0.9)
EOSINOPHIL NFR BLD AUTO: 1.7 %
ERYTHROCYTE [DISTWIDTH] IN BLOOD BY AUTOMATED COUNT: 14.7 % (ref 11.5–17)
EST. AVERAGE GLUCOSE BLD GHB EST-MCNC: 114 MG/DL
GFR SERPLBLD CREATININE-BSD FMLA CKD-EPI: >60 ML/MIN/1.73/M2
GLOBULIN SER-MCNC: 3.4 GM/DL (ref 2.4–3.5)
GLUCOSE SERPL-MCNC: 133 MG/DL (ref 82–115)
HBA1C MFR BLD: 5.6 %
HCT VFR BLD AUTO: 37.5 % (ref 37–47)
HDLC SERPL-MCNC: 68 MG/DL (ref 35–60)
HGB BLD-MCNC: 13 G/DL (ref 12–16)
IMM GRANULOCYTES # BLD AUTO: 0.03 X10(3)/MCL (ref 0–0.04)
IMM GRANULOCYTES NFR BLD AUTO: 0.4 %
LDLC SERPL CALC-MCNC: 38 MG/DL (ref 50–140)
LYMPHOCYTES # BLD AUTO: 1.76 X10(3)/MCL (ref 0.6–4.6)
LYMPHOCYTES NFR BLD AUTO: 23 %
MCH RBC QN AUTO: 25.9 PG (ref 27–31)
MCHC RBC AUTO-ENTMCNC: 34.7 G/DL (ref 33–36)
MCV RBC AUTO: 74.7 FL (ref 80–94)
MONOCYTES # BLD AUTO: 0.58 X10(3)/MCL (ref 0.1–1.3)
MONOCYTES NFR BLD AUTO: 7.6 %
NEUTROPHILS # BLD AUTO: 5.13 X10(3)/MCL (ref 2.1–9.2)
NEUTROPHILS NFR BLD AUTO: 66.9 %
NRBC BLD AUTO-RTO: 0 %
PLATELET # BLD AUTO: 331 X10(3)/MCL (ref 130–400)
PMV BLD AUTO: 10.7 FL (ref 7.4–10.4)
POTASSIUM SERPL-SCNC: 3.5 MMOL/L (ref 3.5–5.1)
PROT SERPL-MCNC: 7.3 GM/DL (ref 5.8–7.6)
RBC # BLD AUTO: 5.02 X10(6)/MCL (ref 4.2–5.4)
SODIUM SERPL-SCNC: 143 MMOL/L (ref 136–145)
TRIGL SERPL-MCNC: 96 MG/DL (ref 37–140)
VLDLC SERPL CALC-MCNC: 19 MG/DL
WBC # BLD AUTO: 7.66 X10(3)/MCL (ref 4.5–11.5)

## 2025-01-15 PROCEDURE — 82306 VITAMIN D 25 HYDROXY: CPT

## 2025-01-15 PROCEDURE — 83036 HEMOGLOBIN GLYCOSYLATED A1C: CPT

## 2025-01-15 PROCEDURE — 85025 COMPLETE CBC W/AUTO DIFF WBC: CPT

## 2025-01-15 PROCEDURE — 80061 LIPID PANEL: CPT

## 2025-01-15 PROCEDURE — 80053 COMPREHEN METABOLIC PANEL: CPT

## 2025-01-15 PROCEDURE — 36415 COLL VENOUS BLD VENIPUNCTURE: CPT

## 2025-01-15 NOTE — TELEPHONE ENCOUNTER
----- Message from KARY Barrientos sent at 1/15/2025 11:39 AM CST -----  Please inform pt that labs were reviewed, no acute findings, keep apt as scheduled.

## 2025-01-15 NOTE — TELEPHONE ENCOUNTER
Patient informed that labs were reviewed, no acute findings, instructed to keep apt as scheduled.Acknowledged understanding

## 2025-01-29 ENCOUNTER — OFFICE VISIT (OUTPATIENT)
Dept: OTOLARYNGOLOGY | Facility: CLINIC | Age: 70
End: 2025-01-29
Payer: MEDICARE

## 2025-01-29 VITALS
TEMPERATURE: 98 F | SYSTOLIC BLOOD PRESSURE: 133 MMHG | HEART RATE: 67 BPM | BODY MASS INDEX: 27.44 KG/M2 | WEIGHT: 150 LBS | DIASTOLIC BLOOD PRESSURE: 72 MMHG

## 2025-01-29 DIAGNOSIS — E04.1 RIGHT THYROID NODULE: ICD-10-CM

## 2025-01-29 DIAGNOSIS — Q17.8 SPLIT EAR LOBE: ICD-10-CM

## 2025-01-29 PROCEDURE — 99215 OFFICE O/P EST HI 40 MIN: CPT | Mod: PBBFAC | Performed by: OTOLARYNGOLOGY

## 2025-01-29 NOTE — PROGRESS NOTES
[facility]  Otolaryngology-Head & Neck Surgery    Office Visit    Lucila Sharma  [id number]  1955    CC:  Right split ear    HPI: Lucila Sharma is a 69 y.o. female with PMH including HLD, HTN, and multiple thyroid nodules who presents today for right split earlobe that she sustained after rolling over in bed with her earring in place 2-3 months ago.  It has not healed within that time.    Regarding patient's multiple thyroid nodules, recent surveillance ultrasound in 10/2024 with evidence of TI-RADS 4 nodule within the right lobe, now status post biopsy with resultant benign pathology.  She follows with family medicine for her thyroid.    Review of patient's allergies indicates:   Allergen Reactions    Clindamycin Itching    Latex      Other reaction(s): itching       Past Medical History:   Diagnosis Date    Anxiety     Hyperlipidemia     Hypertension        Past Surgical History:   Procedure Laterality Date    CATARACT EXTRACTION Right 11/2020    CATARACT EXTRACTION Left 12/2020    COLON SURGERY      HYSTERECTOMY      NECK SURGERY      SHOULDER SURGERY      TONSILLECTOMY         Social History     Socioeconomic History    Marital status:     Number of children: 2   Occupational History    Occupation: Social Security   Tobacco Use    Smoking status: Never    Smokeless tobacco: Never   Substance and Sexual Activity    Alcohol use: Never    Drug use: Never    Sexual activity: Yes     Partners: Male     Social Drivers of Health     Financial Resource Strain: Medium Risk (5/23/2024)    Overall Financial Resource Strain (CARDIA)     Difficulty of Paying Living Expenses: Somewhat hard   Food Insecurity: Food Insecurity Present (5/23/2024)    Hunger Vital Sign     Worried About Running Out of Food in the Last Year: Sometimes true     Ran Out of Food in the Last Year: Never true   Transportation Needs: No Transportation Needs (8/24/2023)    PRAPARE - Transportation     Lack of  Transportation (Medical): No     Lack of Transportation (Non-Medical): No   Physical Activity: Inactive (5/23/2024)    Exercise Vital Sign     Days of Exercise per Week: 0 days     Minutes of Exercise per Session: 0 min   Stress: Stress Concern Present (8/24/2023)    [employer] - Occupational Stress Questionnaire     Feeling of Stress : To some extent   Housing Stability: Low Risk  (5/23/2024)    Housing Stability Vital Sign     Unable to Pay for Housing in the Last Year: No     Homeless in the Last Year: No       Family History   Problem Relation Name Age of Onset    Diabetes Mother      Heart disease Mother      Hypertension Mother      Diabetes Father      Diabetes Sister      Hypertension Sister      Diabetes Sister      Hypertension Sister      Diabetes Sister         Outpatient Encounter Medications as of 1/29/2025   Medication Sig Dispense Refill    amLODIPine (NORVASC) 10 MG tablet Take 1 tablet (10 mg total) by mouth once daily. 90 tablet 1    atorvastatin (LIPITOR) 20 MG tablet Take 1 tablet (20 mg total) by mouth once daily. 90 tablet 3    azelastine (ASTELIN) 137 mcg (0.1 %) nasal spray 1 spray (137 mcg total) by Nasal route 2 (two) times a day. 30 mL 5    blood sugar diagnostic (ONETOUCH VERIO TEST STRIPS) Strp 1 each by Misc.(Non-Drug; Combo Route) route Daily. DX: E11.9 Use daily to check CBG 50 each 6    blood sugar diagnostic Strp 1 each by Misc.(Non-Drug; Combo Route) route Daily. TRUE METRIX 50 each 6    blood sugar diagnostic Strp One touch Verio  test strips, See Instructions, Use dailyto check glucose  DX :DM ,code:E11.9, # 100 EA, 0 Refill(s), Pharmacy: [id number], 155, cm, Height/Length Dosing, 10/20/21 15:35:00 CDT, 73, kg, Weight Dosing, 10/20/21 15:35:00  each 6    clotrimazole-betamethasone 1-0.05% (LOTRISONE) cream Apply topically 2 (two) times daily as needed (rash). 45 g 1    clotrimazole-betamethasone 1-0.05% (LOTRISONE) cream APPLY   CREAM TOPICALLY TWICE DAILY 45 g 0    EScitalopram oxalate (LEXAPRO) 20 MG tablet Take 20 mg by mouth once daily.      ezetimibe (ZETIA) 10 mg tablet Take 1 tablet (10 mg total) by mouth once daily. 90 tablet 1    fenofibrate (TRICOR) 54 MG tablet Take 1 tablet (54 mg total) by mouth once daily. 90 tablet 1    fluticasone propionate (FLONASE) 50 mcg/actuation nasal spray 1 spray (50 mcg total) by Each Nostril route once daily. 16 g 5    hydroCHLOROthiazide (HYDRODIURIL) 25 MG tablet Take 1 tablet (25 mg total) by mouth once daily. 90 tablet 1    lancets (ONETOUCH DELICA PLUS LANCET) 33 gauge Misc 1 lancet  by Misc.(Non-Drug; Combo Route) route Daily. 50 each 6    lancets 33 gauge Misc by Misc.(Non-Drug; Combo Route) route.      lancets Misc   One Touch Delica Lancets 33g, See Instructions, Use daily to check glucose  Dx DM: code:E11.9, # 100 EA, 0 Refill(s), Pharmacy: [facility] [id number], 155, cm, Height/Length Dosing, 10/20/21 15:35:00 CDT, 73, kg, Weight Dosing, 10/20/21 15:35:00 CDT      LATUDA 40 mg Tab tablet Take 1 tablet by mouth once daily at 6am.      levocetirizine (XYZAL) 5 MG tablet Take 1 tablet (5 mg total) by mouth every evening. 90 tablet 1    meloxicam (MOBIC) 7.5 MG tablet Take 1 tablet (7.5 mg total) by mouth once daily. 90 tablet 1    mirtazapine (REMERON) 30 MG tablet Take 30 mg by mouth every evening.      mupirocin (BACTROBAN) 2 % ointment Apply topically 2 (two) times daily. 30 g 1    ONETOUCH DELICA PLUS LANCET 33 gauge Misc USE 1 LANCET TO CHECK GLUCOSE ONCE DAILY 100 each 4    potassium chloride SA (K-DUR,KLOR-CON) 20 MEQ tablet Take 1 tablet (20 mEq total) by mouth 2 (two) times daily. 180 tablet 1    prazosin (MINIPRESS) 5 MG capsule Take 5 mg by mouth every evening.      pregabalin (LYRICA) 150 MG capsule Take 150 mg by mouth 2 (two) times daily.      pregabalin (LYRICA) 75 MG capsule Take 150 mg by mouth 2 (two) times daily. (Patient not taking: Reported on 8/26/2024)      tiZANidine  (ZANAFLEX) 4 MG tablet Take 1 tablet (4 mg total) by mouth nightly as needed (back spasm). 45 tablet 3    traMADoL (ULTRAM) 50 mg tablet Take 50 mg by mouth 2 (two) times daily as needed. (Patient not taking: Reported on 8/26/2024)      traZODone (DESYREL) 100 MG tablet Take 100 mg by mouth nightly.      triamcinolone acetonide 0.1% (KENALOG) 0.1 % cream Apply topically 2 (two) times daily. 28.4 g 0     No facility-administered encounter medications on file as of 1/29/2025.       PHYSICAL EXAM:  Vitals:    01/29/25 1047   BP: 133/72   Pulse: 67   Temp: 98.1 °F (36.7 °C)       General Appearance: well nourished, well-developed, alert, oriented, in no acute distress, no dysphonia  Head/Face: Normocephalic, atraumatic  Eyes: EOMI, normal conjunctiva  Ears:    AD: lobule with full-thickness defect (image below); EAC within normal limits; TM intact without evidence of effusion  AS:  Lobule with superficial defect; EAC within normal limits; TM intact without evidence of effusion  Nose: External nose normal, septum midline, no inferior turbinate hypertrophy, no epistaxis  Oral Cavity & Oropharynx: Lips normal. Tongue without masses or lesions. Dentition within normal limits for age. Oropharynx unremarkable. No masses, lesions, or leukoplakia. Floor of mouth and base of tongue are soft.   Neck: Soft, non-tender, no palpable lymph nodes. Thyroid without nodules or goiter.   Neuro: CN II - XII intact  Psychiatric: oriented to time, place and person, no depression, anxiety or agitation            PERTINENT DATA:  Imaging personally reviewed:    U/S thyroid (10/07/2024):  FINDINGS:  Right brenda thyroid measures 5.6 x 2.3 x 2.2 cm left brenda thyroid measures 4.4 x 1.7 x 1.7 cm isthmus measures 2.9 mm in thickness     There are subcentimeter nodules in the right brenda thyroid with some heterogenicity of the thyroid parenchyma and increased vascularity there is a 2.3 by 1.7 x 1.9 cm nodule it is solid with punctate calcifications  these corresponds to a TI-RADS type 4 nodule recommendations are biopsy if greater than 1.5 cm.     On the left subcentimeter nodules are identified measuring 8 mm x 6 mm x 6 mm, 6 mm x 3 mm x 4 mm and 5 mm x 3 mm x 3 mm none of these nodules meets criteria for biopsy and or surveillance.     There is some heterogenicity in increased vascularity of the thyroid tissue     Impression:     Heterogenicity in increased vascularity as above.     Index nodule in the right corresponding to a TI-RADS type 4 nodule recommendations are biopsy if greater than 1.5 cm      FNA (10/30/2024):  Final Diagnosis   Fine-needle aspiration of right thyroid nodule:   - Cytomorphology of a benign thyroid lesion. See comment.            ASSESSMENT:  Lucila Sharma is a 69 y.o. female with PMH including HTN, HLD, multiple thyroid nodules who presents with right split earlobe.    PLAN:  --Discussed proceeding with repair at next procedure day; she desires to proceed; advised that she will be unable to wear dangle earrings for 1 year following procedure  --Continue thyroid nodule surveillance with family medicine    RTC next procedure day     Yasmani Hood MD

## 2025-01-30 NOTE — PROGRESS NOTES
I have reviewed and agree with the resident's findings, including all diagnostic interpretations and plans as written.     Jose Angel Yo M.D.

## 2025-02-17 RX ORDER — FENOFIBRATE 54 MG/1
54 TABLET ORAL
Qty: 90 TABLET | Refills: 0 | Status: SHIPPED | OUTPATIENT
Start: 2025-02-17

## 2025-02-17 NOTE — TELEPHONE ENCOUNTER
Pharmacy requesting refill for pt's fenofibrate (TRICOR) 54 MG tablet     LOV: 8/26/24    NOV: 3/11/25

## 2025-03-19 ENCOUNTER — TELEPHONE (OUTPATIENT)
Dept: INTERNAL MEDICINE | Facility: CLINIC | Age: 70
End: 2025-03-19
Payer: MEDICARE

## 2025-03-19 DIAGNOSIS — Z12.31 BREAST CANCER SCREENING BY MAMMOGRAM: Primary | ICD-10-CM

## 2025-03-21 NOTE — TELEPHONE ENCOUNTER
Attempted to contact pt to inform her Mammo orders were placed . Pt did not answer.LVM to return call to [facility].

## 2025-04-02 ENCOUNTER — PROCEDURE VISIT (OUTPATIENT)
Dept: OTOLARYNGOLOGY | Facility: CLINIC | Age: 70
End: 2025-04-02
Payer: MEDICARE

## 2025-04-02 VITALS
RESPIRATION RATE: 20 BRPM | BODY MASS INDEX: 27.6 KG/M2 | HEART RATE: 65 BPM | HEIGHT: 62 IN | TEMPERATURE: 98 F | OXYGEN SATURATION: 99 % | DIASTOLIC BLOOD PRESSURE: 75 MMHG | SYSTOLIC BLOOD PRESSURE: 132 MMHG | WEIGHT: 150 LBS

## 2025-04-02 DIAGNOSIS — Q17.8 SPLIT EAR LOBE: Primary | ICD-10-CM

## 2025-04-02 RX ORDER — LIDOCAINE HYDROCHLORIDE AND EPINEPHRINE 10; 10 UG/ML; MG/ML
1 INJECTION, SOLUTION INFILTRATION; PERINEURAL
Status: COMPLETED | OUTPATIENT
Start: 2025-04-02 | End: 2025-04-02

## 2025-04-02 RX ADMIN — LIDOCAINE HYDROCHLORIDE AND EPINEPHRINE 1 ML: 10; 10 INJECTION, SOLUTION INFILTRATION; PERINEURAL at 12:04

## 2025-04-02 NOTE — PROCEDURES
[facility]  Otolaryngology-Head & Neck Surgery    Office Visit    Lucila Sharma  [id number]  1955    CC:  Right split ear    HPI: Lucila Sharma is a 69 y.o. female with PMH including HLD, HTN, and multiple thyroid nodules who presents today for right split earlobe that she sustained after rolling over in bed with her earring in place 2-3 months ago.  It has not healed within that time.    Regarding patient's multiple thyroid nodules, recent surveillance ultrasound in 10/2024 with evidence of TI-RADS 4 nodule within the right lobe, now status post biopsy with resultant benign pathology.  She follows with family medicine for her thyroid.    4/2/25:  Presents today for procedure.    Review of patient's allergies indicates:   Allergen Reactions    Clindamycin Itching    Latex      Other reaction(s): itching       Past Medical History:   Diagnosis Date    Anxiety     Hyperlipidemia     Hypertension        Past Surgical History:   Procedure Laterality Date    CATARACT EXTRACTION Right 11/2020    CATARACT EXTRACTION Left 12/2020    COLON SURGERY      HYSTERECTOMY      NECK SURGERY      SHOULDER SURGERY      TONSILLECTOMY         Social History     Socioeconomic History    Marital status:     Number of children: 2   Occupational History    Occupation: Social Security   Tobacco Use    Smoking status: Never    Smokeless tobacco: Never   Substance and Sexual Activity    Alcohol use: Never    Drug use: Never    Sexual activity: Yes     Partners: Male     Social Drivers of Health     Financial Resource Strain: High Risk (12/16/2024)    Received from [facility] SDOH Screening     In the past year, have you been unable to get any of the following when you really needed them? choose all that apply.: Internet     In the past year, have you been unable to get any of the following when you really needed them? choose all that apply.: Medicine or health care   Food Insecurity: Food  Insecurity Present (5/23/2024)    Hunger Vital Sign     Worried About Running Out of Food in the Last Year: Sometimes true     Ran Out of Food in the Last Year: Never true   Transportation Needs: No Transportation Needs (8/24/2023)    PRAPARE - Transportation     Lack of Transportation (Medical): No     Lack of Transportation (Non-Medical): No   Physical Activity: Inactive (5/23/2024)    Exercise Vital Sign     Days of Exercise per Week: 0 days     Minutes of Exercise per Session: 0 min   Stress: Stress Concern Present (8/24/2023)    [facility] [place] Occupational Health - Occupational Stress Questionnaire     Feeling of Stress : To some extent   Housing Stability: Unknown (5/23/2024)    Housing Stability Vital Sign     Unable to Pay for Housing in the Last Year: No     Homeless in the Last Year: No       Family History   Problem Relation Name Age of Onset    Diabetes Mother      Heart disease Mother      Hypertension Mother      Diabetes Father      Diabetes Sister      Hypertension Sister      Diabetes Sister      Hypertension Sister      Diabetes Sister         Outpatient Encounter Medications as of 4/2/2025   Medication Sig Dispense Refill    amLODIPine (NORVASC) 10 MG tablet Take 1 tablet (10 mg total) by mouth once daily. 90 tablet 1    atorvastatin (LIPITOR) 20 MG tablet Take 1 tablet (20 mg total) by mouth once daily. 90 tablet 3    azelastine (ASTELIN) 137 mcg (0.1 %) nasal spray 1 spray (137 mcg total) by Nasal route 2 (two) times a day. 30 mL 5    blood sugar diagnostic (ONETOUCH VERIO TEST STRIPS) Strp 1 each by Misc.(Non-Drug; Combo Route) route Daily. DX: E11.9 Use daily to check CBG 50 each 6    blood sugar diagnostic Strp 1 each by Misc.(Non-Drug; Combo Route) route Daily. TRUE METRIX 50 each 6    blood sugar diagnostic Strp One touch Verio  test strips, See Instructions, Use dailyto check glucose  DX :DM ,code:E11.9, # 100 EA, 0 Refill(s), Pharmacy: [id number], 155, cm, Height/Length  Dosing, 10/20/21 15:35:00 CDT, 73, kg, Weight Dosing, 10/20/21 15:35:00  each 6    clotrimazole-betamethasone 1-0.05% (LOTRISONE) cream APPLY  CREAM TOPICALLY TWICE DAILY 45 g 0    EScitalopram oxalate (LEXAPRO) 20 MG tablet Take 20 mg by mouth once daily.      ezetimibe (ZETIA) 10 mg tablet Take 1 tablet (10 mg total) by mouth once daily. 90 tablet 1    fenofibrate (TRICOR) 54 MG tablet Take 1 tablet by mouth once daily 90 tablet 0    fluticasone propionate (FLONASE) 50 mcg/actuation nasal spray 1 spray (50 mcg total) by Each Nostril route once daily. 16 g 5    hydroCHLOROthiazide (HYDRODIURIL) 25 MG tablet Take 1 tablet (25 mg total) by mouth once daily. 90 tablet 1    lancets (ONETOUCH DELICA PLUS LANCET) 33 gauge Misc 1 lancet  by Misc.(Non-Drug; Combo Route) route Daily. 50 each 6    lancets 33 gauge Misc by Misc.(Non-Drug; Combo Route) route.      lancets Misc   One Touch Delica Lancets 33g, See Instructions, Use daily to check glucose  Dx DM: code:E11.9, # 100 EA, 0 Refill(s), Pharmacy: [facility] [id number], 155, cm, Height/Length Dosing, 10/20/21 15:35:00 CDT, 73, kg, Weight Dosing, 10/20/21 15:35:00 CDT      LATUDA 40 mg Tab tablet Take 1 tablet by mouth once daily at 6am.      levocetirizine (XYZAL) 5 MG tablet Take 1 tablet (5 mg total) by mouth every evening. 90 tablet 1    meloxicam (MOBIC) 7.5 MG tablet Take 1 tablet (7.5 mg total) by mouth once daily. 90 tablet 1    mirtazapine (REMERON) 30 MG tablet Take 30 mg by mouth every evening.      mupirocin (BACTROBAN) 2 % ointment Apply topically 2 (two) times daily. 30 g 1    ONETOUCH DELICA PLUS LANCET 33 gauge Misc USE 1 LANCET TO CHECK GLUCOSE ONCE DAILY 100 each 4    potassium chloride SA (K-DUR,KLOR-CON) 20 MEQ tablet Take 1 tablet (20 mEq total) by mouth 2 (two) times daily. 180 tablet 1    prazosin (MINIPRESS) 5 MG capsule Take 5 mg by mouth every evening.      pregabalin (LYRICA) 150 MG capsule Take 150 mg by mouth 2 (two) times daily.       tiZANidine (ZANAFLEX) 4 MG tablet Take 1 tablet (4 mg total) by mouth nightly as needed (back spasm). 45 tablet 3    triamcinolone acetonide 0.1% (KENALOG) 0.1 % cream Apply topically 2 (two) times daily. 28.4 g 0    clotrimazole-betamethasone 1-0.05% (LOTRISONE) cream Apply topically 2 (two) times daily as needed (rash). (Patient not taking: Reported on 4/2/2025) 45 g 1    pregabalin (LYRICA) 75 MG capsule Take 150 mg by mouth 2 (two) times daily. (Patient not taking: Reported on 4/2/2025)      traMADoL (ULTRAM) 50 mg tablet Take 50 mg by mouth 2 (two) times daily as needed. (Patient not taking: Reported on 4/2/2025)      traZODone (DESYREL) 100 MG tablet Take 100 mg by mouth nightly. (Patient not taking: Reported on 4/2/2025)       Facility-Administered Encounter Medications as of 4/2/2025   Medication Dose Route Frequency Provider Last Rate Last Admin    [COMPLETED] LIDOcaine-EPINEPHrine 1%-1:100,000 injection 1 mL  1 mL Intradermal 1 time in Clinic/HOD    1 mL at 04/02/25 1240       PHYSICAL EXAM:  Vitals:    04/02/25 1118   BP: 132/75   Pulse: 65   Resp: 20   Temp: 98.1 °F (36.7 °C)       General Appearance: well nourished, well-developed, alert, oriented, in no acute distress, no dysphonia  Head/Face: Normocephalic, atraumatic  Eyes: EOMI, normal conjunctiva  Ears:    AD: lobule with full-thickness defect (image below); EAC within normal limits; TM intact without evidence of effusion  AS:  Lobule with superficial defect; EAC within normal limits; TM intact without evidence of effusion  Nose: External nose normal, septum midline, no inferior turbinate hypertrophy, no epistaxis  Oral Cavity & Oropharynx: Lips normal. Tongue without masses or lesions. Dentition within normal limits for age. Oropharynx unremarkable. No masses, lesions, or leukoplakia. Floor of mouth and base of tongue are soft.   Neck: Soft, non-tender, no palpable lymph nodes. Thyroid without nodules or goiter.   Neuro: CN II - XII  intact  Psychiatric: oriented to time, place and person, no depression, anxiety or agitation      Procedure: Right split earlobe repair  Consent obtained. Area injected with 1% lidocaine with epinephrine. 15 blade used to de-epithelialize the medial portion of the split earlobe on both sides and z-plasty created anteriorly. The superficial layer of skin was removed. Hemostasis achieved. The deep layer was then closed with 4-0 monocryl suture. The superficial layer was closed with 4-0 nylon. The patient tolerated the procedure well without complication.    PERTINENT DATA:  Imaging personally reviewed:    U/S thyroid (10/07/2024):  FINDINGS:  Right brenda thyroid measures 5.6 x 2.3 x 2.2 cm left brenda thyroid measures 4.4 x 1.7 x 1.7 cm isthmus measures 2.9 mm in thickness     There are subcentimeter nodules in the right brenda thyroid with some heterogenicity of the thyroid parenchyma and increased vascularity there is a 2.3 by 1.7 x 1.9 cm nodule it is solid with punctate calcifications these corresponds to a TI-RADS type 4 nodule recommendations are biopsy if greater than 1.5 cm.     On the left subcentimeter nodules are identified measuring 8 mm x 6 mm x 6 mm, 6 mm x 3 mm x 4 mm and 5 mm x 3 mm x 3 mm none of these nodules meets criteria for biopsy and or surveillance.     There is some heterogenicity in increased vascularity of the thyroid tissue     Impression:     Heterogenicity in increased vascularity as above.     Index nodule in the right corresponding to a TI-RADS type 4 nodule recommendations are biopsy if greater than 1.5 cm      FNA (10/30/2024):  Final Diagnosis   Fine-needle aspiration of right thyroid nodule:   - Cytomorphology of a benign thyroid lesion. See comment.            ASSESSMENT:  Lucila Sharma is a 69 y.o. female with PMH including HTN, HLD, multiple thyroid nodules who presents with right split earlobe.    S/p repair today.    PLAN:  --RTC 1 week for suture removal    Laure Freitas,  MD [facility] Otolaryngology - Head & Neck Surgery  4/2/2025 12:48 PM     Callback Number: [phone]

## 2025-04-09 ENCOUNTER — HOSPITAL ENCOUNTER (OUTPATIENT)
Dept: RADIOLOGY | Facility: HOSPITAL | Age: 70
Discharge: HOME | End: 2025-04-09
Attending: NURSE PRACTITIONER
Payer: MEDICARE

## 2025-04-09 DIAGNOSIS — Z12.31 BREAST CANCER SCREENING BY MAMMOGRAM: ICD-10-CM

## 2025-04-09 PROCEDURE — 77067 SCR MAMMO BI INCL CAD: CPT | Mod: TC

## 2025-04-09 PROCEDURE — 77067 SCR MAMMO BI INCL CAD: CPT | Mod: 26,,, | Performed by: RADIOLOGY

## 2025-04-09 PROCEDURE — 77063 BREAST TOMOSYNTHESIS BI: CPT | Mod: 26,,, | Performed by: RADIOLOGY

## 2025-04-16 ENCOUNTER — OFFICE VISIT (OUTPATIENT)
Dept: OTOLARYNGOLOGY | Facility: CLINIC | Age: 70
End: 2025-04-16
Payer: MEDICARE

## 2025-04-16 VITALS
WEIGHT: 150 LBS | SYSTOLIC BLOOD PRESSURE: 123 MMHG | BODY MASS INDEX: 27.6 KG/M2 | HEART RATE: 80 BPM | OXYGEN SATURATION: 99 % | DIASTOLIC BLOOD PRESSURE: 77 MMHG | HEIGHT: 62 IN | TEMPERATURE: 98 F | RESPIRATION RATE: 20 BRPM

## 2025-04-16 DIAGNOSIS — Q17.8 SPLIT EAR LOBE: Primary | ICD-10-CM

## 2025-04-16 PROCEDURE — 99215 OFFICE O/P EST HI 40 MIN: CPT | Mod: PBBFAC

## 2025-04-16 NOTE — PROGRESS NOTES
[facility]  Otolaryngology-Head & Neck Surgery    Office Visit    Lucila Sharma  [id number]  1955    CC:  Right split ear    HPI: Lucila Sharma is a 69 y.o. female with PMH including HLD, HTN, and multiple thyroid nodules who presents today for right split earlobe that she sustained after rolling over in bed with her earring in place 2-3 months ago.  It has not healed within that time.    Regarding patient's multiple thyroid nodules, recent surveillance ultrasound in 10/2024 with evidence of TI-RADS 4 nodule within the right lobe, now status post biopsy with resultant benign pathology.  She follows with family medicine for her thyroid.    4/16/25:  Presents today for follow-up.  Had split earlobe repair 04/02/2025.  Has been using ointment intermittently.    Review of patient's allergies indicates:   Allergen Reactions    Clindamycin Itching    Latex      Other reaction(s): itching       Past Medical History:   Diagnosis Date    Anxiety     Hyperlipidemia     Hypertension        Past Surgical History:   Procedure Laterality Date    CATARACT EXTRACTION Right 11/2020    CATARACT EXTRACTION Left 12/2020    COLON SURGERY      HYSTERECTOMY      NECK SURGERY      SHOULDER SURGERY      TONSILLECTOMY         Social History     Socioeconomic History    Marital status:     Number of children: 2   Occupational History    Occupation: Social Security   Tobacco Use    Smoking status: Never    Smokeless tobacco: Never   Substance and Sexual Activity    Alcohol use: Never    Drug use: Never    Sexual activity: Yes     Partners: Male     Social Drivers of Health     Financial Resource Strain: High Risk (12/16/2024)    Received from [facility] SDOH Screening     In the past year, have you been unable to get any of the following when you really needed them? choose all that apply.: Internet     In the past year, have you been unable to get any of the following when you really needed  them? choose all that apply.: Medicine or health care   Food Insecurity: Food Insecurity Present (5/23/2024)    Hunger Vital Sign     Worried About Running Out of Food in the Last Year: Sometimes true     Ran Out of Food in the Last Year: Never true   Transportation Needs: No Transportation Needs (8/24/2023)    PRAPARE - Transportation     Lack of Transportation (Medical): No     Lack of Transportation (Non-Medical): No   Physical Activity: Inactive (5/23/2024)    Exercise Vital Sign     Days of Exercise per Week: 0 days     Minutes of Exercise per Session: 0 min   Stress: Stress Concern Present (8/24/2023)    [employer] [place] of [employer]ational Health - Occupational Stress Questionnaire     Feeling of Stress : To some extent   Housing Stability: Unknown (5/23/2024)    Housing Stability Vital Sign     Unable to Pay for Housing in the Last Year: No     Homeless in the Last Year: No       Family History   Problem Relation Name Age of Onset    Diabetes Mother      Heart disease Mother      Hypertension Mother      Diabetes Father      Diabetes Sister      Hypertension Sister      Diabetes Sister      Hypertension Sister      Diabetes Sister         Outpatient Encounter Medications as of 4/16/2025   Medication Sig Dispense Refill    amLODIPine (NORVASC) 10 MG tablet Take 1 tablet (10 mg total) by mouth once daily. 90 tablet 1    atorvastatin (LIPITOR) 20 MG tablet Take 1 tablet (20 mg total) by mouth once daily. 90 tablet 3    azelastine (ASTELIN) 137 mcg (0.1 %) nasal spray 1 spray (137 mcg total) by Nasal route 2 (two) times a day. 30 mL 5    blood sugar diagnostic (ONETOUCH VERIO TEST STRIPS) Strp 1 each by Misc.(Non-Drug; Combo Route) route Daily. DX: E11.9 Use daily to check CBG 50 each 6    blood sugar diagnostic Strp 1 each by Misc.(Non-Drug; Combo Route) route Daily. TRUE METRIX 50 each 6    blood sugar diagnostic Strp One touch Verio  test strips, See Instructions, Use dailyto check glucose  DX :DM ,code:E11.9, #  100 EA, 0 Refill(s), Pharmacy: [id number], 155, cm, Height/Length Dosing, 10/20/21 15:35:00 CDT, 73, kg, Weight Dosing, 10/20/21 15:35:00  each 6    clotrimazole-betamethasone 1-0.05% (LOTRISONE) cream APPLY  CREAM TOPICALLY TWICE DAILY 45 g 0    EScitalopram oxalate (LEXAPRO) 20 MG tablet Take 20 mg by mouth once daily.      ezetimibe (ZETIA) 10 mg tablet Take 1 tablet (10 mg total) by mouth once daily. 90 tablet 1    fluticasone propionate (FLONASE) 50 mcg/actuation nasal spray 1 spray (50 mcg total) by Each Nostril route once daily. 16 g 5    hydroCHLOROthiazide (HYDRODIURIL) 25 MG tablet Take 1 tablet (25 mg total) by mouth once daily. 90 tablet 1    lancets (ONETOUCH DELICA PLUS LANCET) 33 gauge Misc 1 lancet  by Misc.(Non-Drug; Combo Route) route Daily. 50 each 6    lancets 33 gauge Misc by Misc.(Non-Drug; Combo Route) route.      lancets Misc   One Touch Delica Lancets 33g, See Instructions, Use daily to check glucose  Dx DM: code:E11.9, # 100 EA, 0 Refill(s), Pharmacy: [id number], 155, cm, Height/Length Dosing, 10/20/21 15:35:00 CDT, 73, kg, Weight Dosing, 10/20/21 15:35:00 CDT      LATUDA 40 mg Tab tablet Take 1 tablet by mouth once daily at 6am.      levocetirizine (XYZAL) 5 MG tablet Take 1 tablet (5 mg total) by mouth every evening. 90 tablet 1    meloxicam (MOBIC) 7.5 MG tablet Take 1 tablet (7.5 mg total) by mouth once daily. 90 tablet 1    mirtazapine (REMERON) 30 MG tablet Take 30 mg by mouth every evening.      mupirocin (BACTROBAN) 2 % ointment Apply topically 2 (two) times daily. 30 g 1    ONETOUCH DELICA PLUS LANCET 33 gauge Misc USE 1 LANCET TO CHECK GLUCOSE ONCE DAILY 100 each 4    potassium chloride SA (K-DUR,KLOR-CON) 20 MEQ tablet Take 1 tablet (20 mEq total) by mouth 2 (two) times daily. 180 tablet 1    prazosin (MINIPRESS) 5 MG capsule Take 5 mg by mouth every evening.      tiZANidine (ZANAFLEX) 4 MG tablet Take 1 tablet (4 mg total) by mouth nightly as needed  (back spasm). 45 tablet 3    triamcinolone acetonide 0.1% (KENALOG) 0.1 % cream Apply topically 2 (two) times daily. 28.4 g 0    clotrimazole-betamethasone 1-0.05% (LOTRISONE) cream Apply topically 2 (two) times daily as needed (rash). (Patient not taking: Reported on 4/16/2025) 45 g 1    fenofibrate (TRICOR) 54 MG tablet Take 1 tablet by mouth once daily 90 tablet 0    pregabalin (LYRICA) 150 MG capsule Take 150 mg by mouth 2 (two) times daily.      pregabalin (LYRICA) 75 MG capsule Take 150 mg by mouth 2 (two) times daily. (Patient not taking: Reported on 8/26/2024)      traMADoL (ULTRAM) 50 mg tablet Take 50 mg by mouth 2 (two) times daily as needed. (Patient not taking: Reported on 8/26/2024)      traZODone (DESYREL) 100 MG tablet Take 100 mg by mouth nightly. (Patient not taking: Reported on 4/16/2025)       No facility-administered encounter medications on file as of 4/16/2025.       PHYSICAL EXAM:  Vitals:    04/16/25 0831   BP: 123/77   Pulse: 80   Resp: 20   Temp: 97.8 °F (36.6 °C)       General Appearance: well nourished, well-developed, alert, oriented, in no acute distress, no dysphonia  Head/Face: Normocephalic, atraumatic  Eyes: EOMI, normal conjunctiva  Ears:    AD: lobule with full-thickness defect s/p repair - nylon in place removed today; EAC within normal limits; TM intact without evidence of effusion  AS:  Lobule with superficial defect; EAC within normal limits; TM intact without evidence of effusion  Nose: External nose normal, septum midline, no inferior turbinate hypertrophy, no epistaxis  Oral Cavity & Oropharynx: Lips normal. Tongue without masses or lesions. Dentition within normal limits for age. Oropharynx unremarkable. No masses, lesions, or leukoplakia. Floor of mouth and base of tongue are soft.   Neck: Soft, non-tender, no palpable lymph nodes. Thyroid without nodules or goiter.   Neuro: CN II - XII intact  Psychiatric: oriented to time, place and person, no depression, anxiety or  agitation    PREOP:        PERTINENT DATA:  Imaging personally reviewed:    U/S thyroid (10/07/2024):  FINDINGS:  Right brenda thyroid measures 5.6 x 2.3 x 2.2 cm left brenda thyroid measures 4.4 x 1.7 x 1.7 cm isthmus measures 2.9 mm in thickness     There are subcentimeter nodules in the right brenda thyroid with some heterogenicity of the thyroid parenchyma and increased vascularity there is a 2.3 by 1.7 x 1.9 cm nodule it is solid with punctate calcifications these corresponds to a TI-RADS type 4 nodule recommendations are biopsy if greater than 1.5 cm.     On the left subcentimeter nodules are identified measuring 8 mm x 6 mm x 6 mm, 6 mm x 3 mm x 4 mm and 5 mm x 3 mm x 3 mm none of these nodules meets criteria for biopsy and or surveillance.     There is some heterogenicity in increased vascularity of the thyroid tissue     Impression:     Heterogenicity in increased vascularity as above.     Index nodule in the right corresponding to a TI-RADS type 4 nodule recommendations are biopsy if greater than 1.5 cm      FNA (10/30/2024):  Final Diagnosis   Fine-needle aspiration of right thyroid nodule:   - Cytomorphology of a benign thyroid lesion. See comment.            ASSESSMENT:  Lucila Sharma is a 69 y.o. female with PMH including HTN, HLD, multiple thyroid nodules who presents with right split earlobe now s/p repair.    PLAN:  - Gentle massage  - Aquaphor, hydrogen peroxide for crusting  - RTC 3-4 weeks

## 2025-04-23 ENCOUNTER — HOSPITAL ENCOUNTER (OUTPATIENT)
Dept: RADIOLOGY | Facility: HOSPITAL | Age: 70
Discharge: HOME | End: 2025-04-23
Attending: NURSE PRACTITIONER
Payer: MEDICARE

## 2025-04-23 ENCOUNTER — OFFICE VISIT (OUTPATIENT)
Dept: INTERNAL MEDICINE | Facility: CLINIC | Age: 70
End: 2025-04-23
Payer: MEDICARE

## 2025-04-23 VITALS
OXYGEN SATURATION: 98 % | DIASTOLIC BLOOD PRESSURE: 71 MMHG | HEIGHT: 62 IN | HEART RATE: 78 BPM | WEIGHT: 154.19 LBS | BODY MASS INDEX: 28.37 KG/M2 | SYSTOLIC BLOOD PRESSURE: 133 MMHG | RESPIRATION RATE: 20 BRPM | TEMPERATURE: 98 F

## 2025-04-23 DIAGNOSIS — R73.03 PREDIABETES: ICD-10-CM

## 2025-04-23 DIAGNOSIS — M79.644 THUMB PAIN, RIGHT: ICD-10-CM

## 2025-04-23 DIAGNOSIS — I10 HYPERTENSION, UNSPECIFIED TYPE: ICD-10-CM

## 2025-04-23 DIAGNOSIS — E55.9 VITAMIN D DEFICIENCY: ICD-10-CM

## 2025-04-23 DIAGNOSIS — E78.5 HYPERLIPIDEMIA, UNSPECIFIED HYPERLIPIDEMIA TYPE: ICD-10-CM

## 2025-04-23 PROCEDURE — 3288F FALL RISK ASSESSMENT DOCD: CPT | Mod: CPTII,,, | Performed by: NURSE PRACTITIONER

## 2025-04-23 PROCEDURE — 1160F RVW MEDS BY RX/DR IN RCRD: CPT | Mod: CPTII,,, | Performed by: NURSE PRACTITIONER

## 2025-04-23 PROCEDURE — 99214 OFFICE O/P EST MOD 30 MIN: CPT | Mod: S$PBB,,, | Performed by: NURSE PRACTITIONER

## 2025-04-23 PROCEDURE — 1159F MED LIST DOCD IN RCRD: CPT | Mod: CPTII,,, | Performed by: NURSE PRACTITIONER

## 2025-04-23 PROCEDURE — 73140 X-RAY EXAM OF FINGER(S): CPT | Mod: TC,RT

## 2025-04-23 PROCEDURE — 3075F SYST BP GE 130 - 139MM HG: CPT | Mod: CPTII,,, | Performed by: NURSE PRACTITIONER

## 2025-04-23 PROCEDURE — 3044F HG A1C LEVEL LT 7.0%: CPT | Mod: CPTII,,, | Performed by: NURSE PRACTITIONER

## 2025-04-23 PROCEDURE — 1126F AMNT PAIN NOTED NONE PRSNT: CPT | Mod: CPTII,,, | Performed by: NURSE PRACTITIONER

## 2025-04-23 PROCEDURE — 3078F DIAST BP <80 MM HG: CPT | Mod: CPTII,,, | Performed by: NURSE PRACTITIONER

## 2025-04-23 PROCEDURE — 99215 OFFICE O/P EST HI 40 MIN: CPT | Mod: PBBFAC,25 | Performed by: NURSE PRACTITIONER

## 2025-04-23 PROCEDURE — 1101F PT FALLS ASSESS-DOCD LE1/YR: CPT | Mod: CPTII,,, | Performed by: NURSE PRACTITIONER

## 2025-04-23 PROCEDURE — 3008F BODY MASS INDEX DOCD: CPT | Mod: CPTII,,, | Performed by: NURSE PRACTITIONER

## 2025-04-23 RX ORDER — FENOFIBRATE 54 MG/1
54 TABLET ORAL DAILY
Qty: 90 TABLET | Refills: 1 | Status: SHIPPED | OUTPATIENT
Start: 2025-04-23

## 2025-04-23 RX ORDER — MELOXICAM 7.5 MG/1
7.5 TABLET ORAL DAILY
Qty: 90 TABLET | Refills: 1 | Status: SHIPPED | OUTPATIENT
Start: 2025-04-23

## 2025-04-23 RX ORDER — EZETIMIBE 10 MG/1
10 TABLET ORAL DAILY
Qty: 90 TABLET | Refills: 1 | Status: SHIPPED | OUTPATIENT
Start: 2025-04-23 | End: 2026-04-23

## 2025-04-23 RX ORDER — AZELASTINE 1 MG/ML
1 SPRAY, METERED NASAL 2 TIMES DAILY
Qty: 30 ML | Refills: 5 | Status: SHIPPED | OUTPATIENT
Start: 2025-04-23

## 2025-04-23 RX ORDER — HYDROCHLOROTHIAZIDE 25 MG/1
25 TABLET ORAL DAILY
Qty: 90 TABLET | Refills: 1 | Status: SHIPPED | OUTPATIENT
Start: 2025-04-23 | End: 2026-04-23

## 2025-04-23 RX ORDER — POTASSIUM CHLORIDE 20 MEQ/1
20 TABLET, EXTENDED RELEASE ORAL 2 TIMES DAILY
Qty: 180 TABLET | Refills: 1 | Status: SHIPPED | OUTPATIENT
Start: 2025-04-23

## 2025-04-23 RX ORDER — FLUTICASONE PROPIONATE 50 MCG
1 SPRAY, SUSPENSION (ML) NASAL DAILY
Qty: 16 G | Refills: 5 | Status: SHIPPED | OUTPATIENT
Start: 2025-04-23

## 2025-04-23 RX ORDER — LANCETS 33 GAUGE
1 EACH MISCELLANEOUS DAILY
Qty: 50 EACH | Refills: 6 | Status: SHIPPED | OUTPATIENT
Start: 2025-04-23

## 2025-04-23 RX ORDER — AMLODIPINE BESYLATE 10 MG/1
10 TABLET ORAL DAILY
Qty: 90 TABLET | Refills: 1 | Status: SHIPPED | OUTPATIENT
Start: 2025-04-23

## 2025-04-23 RX ORDER — LEVOCETIRIZINE DIHYDROCHLORIDE 5 MG/1
5 TABLET, FILM COATED ORAL NIGHTLY
Qty: 90 TABLET | Refills: 1 | Status: SHIPPED | OUTPATIENT
Start: 2025-04-23 | End: 2026-04-23

## 2025-04-23 RX ORDER — TIZANIDINE 4 MG/1
4 TABLET ORAL NIGHTLY PRN
Qty: 45 TABLET | Refills: 3 | Status: SHIPPED | OUTPATIENT
Start: 2025-04-23

## 2025-04-23 RX ORDER — DICLOFENAC SODIUM 10 MG/G
2 GEL TOPICAL 4 TIMES DAILY PRN
Qty: 100 G | Refills: 5 | Status: SHIPPED | OUTPATIENT
Start: 2025-04-23
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
## 2025-05-27 ENCOUNTER — OFFICE VISIT (OUTPATIENT)
Dept: OTOLARYNGOLOGY | Facility: CLINIC | Age: 70
End: 2025-05-27
Payer: MEDICARE

## 2025-05-27 VITALS
OXYGEN SATURATION: 96 % | TEMPERATURE: 98 F | HEART RATE: 79 BPM | WEIGHT: 154 LBS | HEIGHT: 62 IN | BODY MASS INDEX: 28.34 KG/M2 | SYSTOLIC BLOOD PRESSURE: 133 MMHG | RESPIRATION RATE: 20 BRPM | DIASTOLIC BLOOD PRESSURE: 69 MMHG

## 2025-05-27 DIAGNOSIS — Q17.8 SPLIT EAR LOBE: Primary | ICD-10-CM

## 2025-05-27 DIAGNOSIS — E04.1 RIGHT THYROID NODULE: ICD-10-CM

## 2025-05-27 PROCEDURE — 99215 OFFICE O/P EST HI 40 MIN: CPT | Mod: PBBFAC

## 2025-05-27 NOTE — PROGRESS NOTES
[facility]  Otolaryngology-Head & Neck Surgery    Office Visit    Lucila Sharma  [id number]  1955    CC:  Right split ear    HPI: Lucila Sharma is a 69 y.o. female with PMH including HLD, HTN, and multiple thyroid nodules who presents today for right split earlobe that she sustained after rolling over in bed with her earring in place 2-3 months ago.  It has not healed within that time.    Regarding patient's multiple thyroid nodules, recent surveillance ultrasound in 10/2024 with evidence of TI-RADS 4 nodule within the right lobe, now status post biopsy with resultant benign pathology.  She follows with family medicine for her thyroid.    4/16/25:  Presents today for follow-up.  Had split earlobe repair 04/02/2025.  Has been using ointment intermittently.    5/27/25:  For follow up.  Has been massaging intermittently.  Feels like it is well healed. No other complaints today    Review of patient's allergies indicates:   Allergen Reactions    Clindamycin Itching    Latex      Other reaction(s): itching       Past Medical History:   Diagnosis Date    Anxiety     Hyperlipidemia     Hypertension        Past Surgical History:   Procedure Laterality Date    CATARACT EXTRACTION Right 11/2020    CATARACT EXTRACTION Left 12/2020    COLON SURGERY      HYSTERECTOMY      NECK SURGERY      SHOULDER SURGERY      TONSILLECTOMY         Social History     Socioeconomic History    Marital status:     Number of children: 2   Occupational History    Occupation: Social Security   Tobacco Use    Smoking status: Never    Smokeless tobacco: Never   Substance and Sexual Activity    Alcohol use: Never    Drug use: Never    Sexual activity: Yes     Partners: Male     Social Drivers of Health     Financial Resource Strain: High Risk (12/16/2024)    Received from [facility] SDOH Screening     In the past year, have you been unable to get any of the following when you really needed them? choose  all that apply.: Internet     In the past year, have you been unable to get any of the following when you really needed them? choose all that apply.: Medicine or health care   Food Insecurity: Food Insecurity Present (5/23/2024)    Hunger Vital Sign     Worried About Running Out of Food in the Last Year: Sometimes true     Ran Out of Food in the Last Year: Never true   Transportation Needs: No Transportation Needs (8/24/2023)    PRAPARE - Transportation     Lack of Transportation (Medical): No     Lack of Transportation (Non-Medical): No   Physical Activity: Inactive (5/23/2024)    Exercise Vital Sign     Days of Exercise per Week: 0 days     Minutes of Exercise per Session: 0 min   Stress: Stress Concern Present (8/24/2023)    [facility] - Occupational Stress Questionnaire     Feeling of Stress : To some extent   Housing Stability: Unknown (5/23/2024)    Housing Stability Vital Sign     Unable to Pay for Housing in the Last Year: No     Homeless in the Last Year: No       Family History   Problem Relation Name Age of Onset    Diabetes Mother      Heart disease Mother      Hypertension Mother      Diabetes Father      Diabetes Sister      Hypertension Sister      Diabetes Sister      Hypertension Sister      Diabetes Sister         Outpatient Encounter Medications as of 5/27/2025   Medication Sig Dispense Refill    amLODIPine (NORVASC) 10 MG tablet Take 1 tablet (10 mg total) by mouth once daily. 90 tablet 1    atorvastatin (LIPITOR) 20 MG tablet Take 1 tablet (20 mg total) by mouth once daily. 90 tablet 3    azelastine (ASTELIN) 137 mcg (0.1 %) nasal spray 1 spray (137 mcg total) by Nasal route 2 (two) times a day. 30 mL 5    blood sugar diagnostic (ONETOUCH VERIO TEST STRIPS) Strp 1 each by Misc.(Non-Drug; Combo Route) route Daily. DX: E11.9 Use daily to check CBG 50 each 6    blood sugar diagnostic Strp 1 each by Misc.(Non-Drug; Combo Route) route Daily. TRUE METRIX 50 each 6     blood sugar diagnostic Strp One touch Verio  test strips, See Instructions, Use dailyto check glucose  DX :DM ,code:E11.9, # 100 EA, 0 Refill(s), Pharmacy: [id number], 155, cm, Height/Length Dosing, 10/20/21 15:35:00 CDT, 73, kg, Weight Dosing, 10/20/21 15:35:00  each 6    clotrimazole-betamethasone 1-0.05% (LOTRISONE) cream Apply topically 2 (two) times daily as needed (rash). 45 g 1    diclofenac sodium (VOLTAREN) 1 % Gel Apply 2 g topically 4 (four) times daily as needed (thumb pain). 100 g 5    EScitalopram oxalate (LEXAPRO) 20 MG tablet Take 20 mg by mouth once daily.      ezetimibe (ZETIA) 10 mg tablet Take 1 tablet (10 mg total) by mouth once daily. 90 tablet 1    fenofibrate (TRICOR) 54 MG tablet Take 1 tablet (54 mg total) by mouth once daily. 90 tablet 1    fluticasone propionate (FLONASE) 50 mcg/actuation nasal spray 1 spray (50 mcg total) by Each Nostril route once daily. 16 g 5    hydroCHLOROthiazide (HYDRODIURIL) 25 MG tablet Take 1 tablet (25 mg total) by mouth once daily. 90 tablet 1    lancets (ONETOUCH DELICA PLUS LANCET) 33 gauge Misc 1 lancet  by Misc.(Non-Drug; Combo Route) route Daily. 50 each 6    lancets 33 gauge Misc by Misc.(Non-Drug; Combo Route) route.      lancets Misc   One Touch Delica Lancets 33g, See Instructions, Use daily to check glucose  Dx DM: code:E11.9, # 100 EA, 0 Refill(s), Pharmacy: [id number], 155, cm, Height/Length Dosing, 10/20/21 15:35:00 CDT, 73, kg, Weight Dosing, 10/20/21 15:35:00 CDT      LATUDA 40 mg Tab tablet Take 1 tablet by mouth once daily at 6am.      levocetirizine (XYZAL) 5 MG tablet Take 1 tablet (5 mg total) by mouth every evening. 90 tablet 1    meloxicam (MOBIC) 7.5 MG tablet Take 1 tablet (7.5 mg total) by mouth once daily. 90 tablet 1    mirtazapine (REMERON) 30 MG tablet Take 30 mg by mouth every evening.      mupirocin (BACTROBAN) 2 % ointment Apply topically 2 (two) times daily. 30 g 1    ONETOUCH DELICA PLUS LANCET 33  gauge Misc USE 1 LANCET TO CHECK GLUCOSE ONCE DAILY 100 each 4    potassium chloride SA (K-DUR,KLOR-CON) 20 MEQ tablet Take 1 tablet (20 mEq total) by mouth 2 (two) times daily. 180 tablet 1    prazosin (MINIPRESS) 5 MG capsule Take 5 mg by mouth every evening.      pregabalin (LYRICA) 150 MG capsule Take 150 mg by mouth 2 (two) times daily.      tiZANidine (ZANAFLEX) 4 MG tablet Take 1 tablet (4 mg total) by mouth nightly as needed (back spasm). 45 tablet 3    triamcinolone acetonide 0.1% (KENALOG) 0.1 % cream Apply topically 2 (two) times daily. 28.4 g 0    clotrimazole-betamethasone 1-0.05% (LOTRISONE) cream APPLY  CREAM TOPICALLY TWICE DAILY (Patient not taking: Reported on 5/27/2025) 45 g 0    pregabalin (LYRICA) 75 MG capsule Take 150 mg by mouth 2 (two) times daily. (Patient not taking: Reported on 5/27/2025)      traZODone (DESYREL) 100 MG tablet Take 100 mg by mouth nightly. (Patient not taking: Reported on 5/27/2025)       No facility-administered encounter medications on file as of 5/27/2025.       PHYSICAL EXAM:  Vitals:    05/27/25 0827   BP: 133/69   Pulse: 79   Resp:    Temp:        General Appearance: well nourished, well-developed, alert, oriented, in no acute distress, no dysphonia  Head/Face: Normocephalic, atraumatic  Eyes: EOMI, normal conjunctiva  Ears:    AD: lobule with full-thickness defect s/p repair - see photo; EAC within normal limits; TM intact without evidence of effusion  AS:  Lobule with superficial defect; EAC within normal limits; TM intact without evidence of effusion  Nose: External nose normal, septum midline, no inferior turbinate hypertrophy, no epistaxis  Oral Cavity & Oropharynx: Lips normal. Tongue without masses or lesions. Dentition within normal limits for age. Oropharynx unremarkable. No masses, lesions, or leukoplakia. Floor of mouth and base of tongue are soft.   Neck: Soft, non-tender, no palpable lymph nodes. Thyroid without nodules or goiter.   Neuro: CN II - XII  intact  Psychiatric: oriented to time, place and person, no depression, anxiety or agitation    PREOP:        1 MO:      PERTINENT DATA:  Imaging personally reviewed:    U/S thyroid (10/07/2024):  FINDINGS:  Right brenda thyroid measures 5.6 x 2.3 x 2.2 cm left brenda thyroid measures 4.4 x 1.7 x 1.7 cm isthmus measures 2.9 mm in thickness     There are subcentimeter nodules in the right brenda thyroid with some heterogenicity of the thyroid parenchyma and increased vascularity there is a 2.3 by 1.7 x 1.9 cm nodule it is solid with punctate calcifications these corresponds to a TI-RADS type 4 nodule recommendations are biopsy if greater than 1.5 cm.     On the left subcentimeter nodules are identified measuring 8 mm x 6 mm x 6 mm, 6 mm x 3 mm x 4 mm and 5 mm x 3 mm x 3 mm none of these nodules meets criteria for biopsy and or surveillance.     There is some heterogenicity in increased vascularity of the thyroid tissue     Impression:     Heterogenicity in increased vascularity as above.     Index nodule in the right corresponding to a TI-RADS type 4 nodule recommendations are biopsy if greater than 1.5 cm      FNA (10/30/2024):  Final Diagnosis   Fine-needle aspiration of right thyroid nodule:   - Cytomorphology of a benign thyroid lesion. See comment.            ASSESSMENT:  Lucila Sharma is a 69 y.o. female with PMH including HTN, HLD, multiple thyroid nodules who presents with right split earlobe now s/p repair.    PLAN:  - Gentle massage  - RTC 5-6 mo to reassess healing possibly repierce ear    Laure Freitas MD  [facility] - Head [facility] Neck [facility]  5/27/2025 8:56 AM     [phone]: [phone]

## 2025-07-08 ENCOUNTER — OFFICE VISIT (OUTPATIENT)
Dept: GYNECOLOGY | Facility: CLINIC | Age: 70
End: 2025-07-08
Payer: MEDICARE

## 2025-07-08 VITALS
SYSTOLIC BLOOD PRESSURE: 145 MMHG | OXYGEN SATURATION: 99 % | HEIGHT: 62 IN | WEIGHT: 150.63 LBS | RESPIRATION RATE: 20 BRPM | HEART RATE: 79 BPM | TEMPERATURE: 99 F | DIASTOLIC BLOOD PRESSURE: 74 MMHG | BODY MASS INDEX: 27.72 KG/M2

## 2025-07-08 DIAGNOSIS — Z01.419 ENCOUNTER FOR ANNUAL ROUTINE GYNECOLOGICAL EXAMINATION: Primary | ICD-10-CM

## 2025-07-08 DIAGNOSIS — Z12.11 COLON CANCER SCREENING: ICD-10-CM

## 2025-07-08 PROCEDURE — 3078F DIAST BP <80 MM HG: CPT | Mod: CPTII,,, | Performed by: NURSE PRACTITIONER

## 2025-07-08 PROCEDURE — 3077F SYST BP >= 140 MM HG: CPT | Mod: CPTII,,, | Performed by: NURSE PRACTITIONER

## 2025-07-08 PROCEDURE — 1159F MED LIST DOCD IN RCRD: CPT | Mod: CPTII,,, | Performed by: NURSE PRACTITIONER

## 2025-07-08 PROCEDURE — 3044F HG A1C LEVEL LT 7.0%: CPT | Mod: CPTII,,, | Performed by: NURSE PRACTITIONER

## 2025-07-08 PROCEDURE — 3288F FALL RISK ASSESSMENT DOCD: CPT | Mod: CPTII,,, | Performed by: NURSE PRACTITIONER

## 2025-07-08 PROCEDURE — G0101 CA SCREEN;PELVIC/BREAST EXAM: HCPCS | Mod: PBBFAC | Performed by: NURSE PRACTITIONER

## 2025-07-08 PROCEDURE — G0101 CA SCREEN;PELVIC/BREAST EXAM: HCPCS | Mod: S$PBB,,, | Performed by: NURSE PRACTITIONER

## 2025-07-08 PROCEDURE — 1101F PT FALLS ASSESS-DOCD LE1/YR: CPT | Mod: CPTII,,, | Performed by: NURSE PRACTITIONER

## 2025-07-08 PROCEDURE — 99213 OFFICE O/P EST LOW 20 MIN: CPT | Mod: PBBFAC | Performed by: NURSE PRACTITIONER

## 2025-07-08 NOTE — PROGRESS NOTES
"  [facility] -  Gynecology / Women's Health Clinic    Subjective:       Patient ID: Lucila Sharma is a 69 y.o. female.    Chief Complaint:  Gynecologic Exam    History of Present Illness  The patient  here for annual exam. Last exam  here at [facility]. Pt had total hysterectomy in 50s for AUB. Denies history of abnormal paps. MG-4/10/25 & BIRADS 1. Denies breast or urinary complaints. Denies pelvic pain or discharge. Denies tobacco use. DEXA in 3/2023-Osteopenia, takes calcium and vit D. Denies fly hx of breast, ovarian, uterine or colon cancer.      GYN & OB History  No LMP recorded. Patient has had a hysterectomy.     Review of patient's allergies indicates:   Allergen Reactions    Clindamycin Itching    Latex      Other reaction(s): itching     Past Medical History:   Diagnosis Date    Anxiety     Hyperlipidemia     Hypertension      OB History    Para Term  AB Living   2 2       SAB IAB Ectopic Multiple Live Births             # Outcome Date GA Lbr Lucas/2nd Weight Sex Type Anes PTL Lv   2 Para            1 Para                 Review of Systems  Review of Systems    Negative except for pertinent findings for positives per HPI     Objective:    Physical Exam    BP (!) 145/74 (BP Location: Right arm, Patient Position: Sitting)   Pulse 79   Temp 98.6 °F (37 °C) (Oral)   Resp 20   Ht 5' 2" (1.575 m)   Wt 68.3 kg (150 lb 9.6 oz)   SpO2 99%   BMI 27.55 kg/m²   GENERAL: Well-developed female in no acute distress.  SKIN: Normal to inspection,warm, dry and intact.  BREASTS: No rashes or erythema. No masses, lumps, discharge, tenderness.  VULVA: General appearance WNL; external genitalia with no lesions or erythema.  BIMANUAL EXAM: Vaginal mucosa/vault atrophic, Vaginal cuff intact. Uterus/Cervix surgically absent. Ovaries surgically absent. Juan adnexa reveal no tenderness.  PSYCHIATRIC: Patient is oriented to person, place, and time. Mood and affect are normal.    Assessment:    "      ICD-10-CM ICD-9-CM   1. Encounter for annual routine gynecological examination  Z01.419 V72.31   2. Colon cancer screening  Z12.11 V76.51     Plan:   Lucila was seen today for gynecologic exam.    Diagnoses and all orders for this visit:    Encounter for annual routine gynecological examination    Colon cancer screening  -     Ambulatory referral/consult to Gastroenterology; Future    Pelvic today, pap deferred d/t hysterectomy  Colonoscopy referral  Call clinic with any GYN concerns  Follow up in about 1 year (around 7/8/2026) for annual exam.

## 2025-07-15 ENCOUNTER — PATIENT MESSAGE (OUTPATIENT)
Facility: CLINIC | Age: 70
End: 2025-07-15
Payer: MEDICARE

## 2025-07-17 ENCOUNTER — OFFICE VISIT (OUTPATIENT)
Dept: CARDIOLOGY | Facility: CLINIC | Age: 70
End: 2025-07-17
Payer: MEDICARE

## 2025-07-17 VITALS
WEIGHT: 148.81 LBS | DIASTOLIC BLOOD PRESSURE: 70 MMHG | TEMPERATURE: 98 F | BODY MASS INDEX: 27.38 KG/M2 | HEART RATE: 72 BPM | RESPIRATION RATE: 18 BRPM | HEIGHT: 62 IN | OXYGEN SATURATION: 98 % | SYSTOLIC BLOOD PRESSURE: 132 MMHG

## 2025-07-17 DIAGNOSIS — E78.5 HYPERLIPIDEMIA, UNSPECIFIED HYPERLIPIDEMIA TYPE: ICD-10-CM

## 2025-07-17 DIAGNOSIS — I34.0 MITRAL VALVE INSUFFICIENCY, UNSPECIFIED ETIOLOGY: Primary | ICD-10-CM

## 2025-07-17 DIAGNOSIS — I10 HYPERTENSION, UNSPECIFIED TYPE: ICD-10-CM

## 2025-07-17 PROCEDURE — 3044F HG A1C LEVEL LT 7.0%: CPT | Mod: CPTII,,,

## 2025-07-17 PROCEDURE — 1101F PT FALLS ASSESS-DOCD LE1/YR: CPT | Mod: CPTII,,,

## 2025-07-17 PROCEDURE — 3078F DIAST BP <80 MM HG: CPT | Mod: CPTII,,,

## 2025-07-17 PROCEDURE — 1126F AMNT PAIN NOTED NONE PRSNT: CPT | Mod: CPTII,,,

## 2025-07-17 PROCEDURE — 99214 OFFICE O/P EST MOD 30 MIN: CPT | Mod: S$PBB,,,

## 2025-07-17 PROCEDURE — 3008F BODY MASS INDEX DOCD: CPT | Mod: CPTII,,,

## 2025-07-17 PROCEDURE — 3288F FALL RISK ASSESSMENT DOCD: CPT | Mod: CPTII,,,

## 2025-07-17 PROCEDURE — 1159F MED LIST DOCD IN RCRD: CPT | Mod: CPTII,,,

## 2025-07-17 PROCEDURE — 3075F SYST BP GE 130 - 139MM HG: CPT | Mod: CPTII,,,

## 2025-07-17 PROCEDURE — 99214 OFFICE O/P EST MOD 30 MIN: CPT | Mod: PBBFAC

## 2025-07-17 PROCEDURE — 1160F RVW MEDS BY RX/DR IN RCRD: CPT | Mod: CPTII,,,

## 2025-07-17 NOTE — PATIENT INSTRUCTIONS
Echocardiogram   Follow up in cardiology clinic in 6 months or sooner if needed   Follow up with PCP as directed   Please notify clinic if any new concerns or any change in symptoms

## 2025-07-17 NOTE — PROGRESS NOTES
CHIEF COMPLAINT: No chief complaint on file.                                                 HPI:  Lucila Sharma 69 y.o. female with a past medical history of hypertension, hyperlipidemia, mild-to-moderate mitral regurgitation, vitamin-D deficiency, hypokalemia presents to Cardiology Clinic today for follow up and ongoing care.  She was referred here per PCP for evidence of mitral regurgitation on recent echocardiogram.  Today the patient states that she feels well from a cardiac standpoint.  She denies any significant cardiovascular complaints today.  She complains of occasional lightheadedness and dizziness that occurs when pacing around, but I think it is my nerves.  She states that sometimes she becomes nervous and anxious so she moves around.  She denies any near-syncope or actual syncopal events.  She denies any other chest pain, SOB, LOVE, palpitations, PND, orthopnea, lower extremity edema, or claudication symptoms.  She states that she deals with nerve pain and muscle pain in her bilateral lower extremities.  PCP as currently treating her with some muscle relaxers which do seem to be helping.  She is able to complete her ADLs without any issues or ischemic symptoms.  She states that she is very active at home and she is constantly moving.  She reports sleeping upright as she is a mouth breather.  She does not use a CPAP at this time, but does have a history of sleep apnea.  She reports compliance with all her current medications and she is tolerating them well.  She denies any tobacco or other illicit drug use.                                                                                                                                                                                                                                                                                                    CARDIAC TESTING:  Results for orders placed during the hospital encounter of  07/11/24    Echo    Interpretation Summary    Left Ventricle: The left ventricle is normal in size. Normal wall thickness. There is normal systolic function. Biplane (2D) method of discs ejection fraction is 73%.    Right Ventricle: Normal right ventricular cavity size. Systolic function is normal.    Aortic Valve: The aortic valve is a trileaflet valve. There is mild aortic valve sclerosis.    Mitral Valve: There is mild to moderate regurgitation.    Pulmonary Artery: The estimated pulmonary artery systolic pressure is 26 mmHg.    IVC/SVC: Normal venous pressure at 3 mmHg.    No results found for this or any previous visit.     No results found for this or any previous visit.       Patient Active Problem List   Diagnosis    Hypertension    Hyperlipidemia    Vitamin D deficiency    Hypokalemia    Mitral valve insufficiency     Past Surgical History:   Procedure Laterality Date    CATARACT EXTRACTION Right 11/2020    CATARACT EXTRACTION Left 12/2020    COLON SURGERY      HYSTERECTOMY      NECK SURGERY      SHOULDER SURGERY      TONSILLECTOMY       Social History     Socioeconomic History    Marital status:     Number of children: 2   Occupational History    Occupation: Social Security   Tobacco Use    Smoking status: Never     Passive exposure: Never    Smokeless tobacco: Never   Substance and Sexual Activity    Alcohol use: Never    Drug use: Never    Sexual activity: Yes     Partners: Male     Social Drivers of Health     Financial Resource Strain: High Risk (12/16/2024)    Received from [facility] SDOH Screening     In the past year, have you been unable to get any of the following when you really needed them? choose all that apply.: Internet     In the past year, have you been unable to get any of the following when you really needed them? choose all that apply.: Medicine or health care   Food Insecurity: Food Insecurity Present (5/23/2024)    Hunger Vital Sign      Worried About Running Out of Food in the Last Year: Sometimes true     Ran Out of Food in the Last Year: Never true   Transportation Needs: No Transportation Needs (8/24/2023)    PRAPARE - Transportation     Lack of Transportation (Medical): No     Lack of Transportation (Non-Medical): No   Physical Activity: Inactive (5/23/2024)    Exercise Vital Sign     Days of Exercise per Week: 0 days     Minutes of Exercise per Session: 0 min   Stress: Stress Concern Present (8/24/2023)    [employer] - Occupational Stress Questionnaire     Feeling of Stress : To some extent   Housing Stability: Unknown (5/23/2024)    Housing Stability Vital Sign     Unable to Pay for Housing in the Last Year: No     Homeless in the Last Year: No        Family History   Problem Relation Name Age of Onset    Diabetes Mother      Heart disease Mother      Hypertension Mother      Diabetes Father      Diabetes Sister      Hypertension Sister      Diabetes Sister      Hypertension Sister      Diabetes Sister       Review of patient's allergies indicates:   Allergen Reactions    Clindamycin Itching    Latex      Other reaction(s): itching         ROS:  Review of Systems   Constitutional: Negative.  Negative for malaise/fatigue.   HENT: Negative.     Eyes: Negative.    Respiratory: Negative.  Negative for shortness of breath.    Cardiovascular:  Negative for chest pain, palpitations, orthopnea, claudication, leg swelling (Mild) and PND.   Gastrointestinal: Negative.    Genitourinary: Negative.    Musculoskeletal:  Positive for myalgias.   Skin: Negative.    Neurological: Negative.  Negative for dizziness and weakness.   Endo/Heme/Allergies: Negative.    Psychiatric/Behavioral: Negative.     All other systems reviewed and are negative.                                                                                                                                                                                Negative except as stated in the history of present illness. See HPI for details.    PHYSICAL EXAM:  There were no vitals taken for this visit.    Physical Exam  Constitutional:       Appearance: Normal appearance. She is not ill-appearing.   HENT:      Head: Normocephalic.      Nose: Nose normal.   Eyes:      Pupils: Pupils are equal, round, and reactive to light.   Neck:      Vascular: No carotid bruit.   Cardiovascular:      Rate and Rhythm: Normal rate and regular rhythm.      Pulses: Normal pulses.      Heart sounds: Normal heart sounds. No murmur heard.  Pulmonary:      Effort: Pulmonary effort is normal.   Abdominal:      General: Abdomen is flat. Bowel sounds are normal.      Palpations: Abdomen is soft.   Musculoskeletal:         General: Normal range of motion.      Cervical back: Normal range of motion.      Right lower leg: No edema (Mild).      Left lower leg: No edema (Mild).   Skin:     General: Skin is warm.   Neurological:      General: No focal deficit present.      Mental Status: She is alert.   Psychiatric:         Mood and Affect: Mood normal.       Current Outpatient Medications   Medication Instructions    amLODIPine (NORVASC) 10 mg, Oral, Daily    atorvastatin (LIPITOR) 20 mg, Oral, Daily    azelastine (ASTELIN) 137 mcg, Nasal, 2 times daily    blood sugar diagnostic (ONETOUCH VERIO TEST STRIPS) Strp 1 each, Misc.(Non-Drug; Combo Route), Daily, DX: E11.9 Use daily to check CBG    blood sugar diagnostic Strp 1 each, Misc.(Non-Drug; Combo Route), Daily, TRUE METRIX    blood sugar diagnostic Strp One touch Verio  test strips, See Instructions, Use dailyto check glucose  DX :DM ,code:E11.9, # 100 EA, 0 Refill(s), Pharmacy: [id number], 155, cm, Height/Length Dosing, 10/20/21 15:35:00 CDT, 73, kg, Weight Dosing, 10/20/21 15:35:00 CDT    clotrimazole-betamethasone 1-0.05% (LOTRISONE) cream Topical (Top), 2 times daily PRN    diclofenac sodium (VOLTAREN) 2 g, Topical (Top), 4  times daily PRN    EScitalopram oxalate (LEXAPRO) 20 mg, Daily    ezetimibe (ZETIA) 10 mg, Oral, Daily    fenofibrate (TRICOR) 54 mg, Oral, Daily    fluticasone propionate (FLONASE) 50 mcg, Each Nostril, Daily    hydroCHLOROthiazide (HYDRODIURIL) 25 mg, Oral, Daily    lancets (ONETOUCH DELICA PLUS LANCET) 33 gauge Misc 1 lancet , Misc.(Non-Drug; Combo Route), Daily    lancets 33 gauge Misc by Misc.(Non-Drug; Combo Route) route.    lancets Misc   One Touch Delica Lancets 33g, See Instructions, Use daily to check glucose  Dx DM: code:E11.9, # 100 EA, 0 Refill(s), Pharmacy: [id number], 155, cm, Height/Length Dosing, 10/20/21 15:35:00 CDT, 73, kg, Weight Dosing, 10/20/21 15:35:00 CDT    LATUDA 40 mg Tab tablet 1 tablet, Daily    levocetirizine (XYZAL) 5 mg, Oral, Nightly    meloxicam (MOBIC) 7.5 mg, Oral, Daily    mirtazapine (REMERON) 30 mg, Nightly    mupirocin (BACTROBAN) 2 % ointment Topical (Top), 2 times daily    ONETOUCH DELICA PLUS LANCET 33 gauge Misc USE 1 LANCET TO CHECK GLUCOSE ONCE DAILY    potassium chloride SA (K-DUR,KLOR-CON) 20 MEQ tablet 20 mEq, Oral, 2 times daily    prazosin (MINIPRESS) 5 mg, Nightly    pregabalin (LYRICA) 150 mg, 2 times daily    tiZANidine (ZANAFLEX) 4 mg, Oral, Nightly PRN    traZODone (DESYREL) 100 mg, Nightly    triamcinolone acetonide 0.1% (KENALOG) 0.1 % cream Topical (Top), 2 times daily        All medications, laboratory studies, cardiac diagnostic imaging reviewed.     Lab Results   Component Value Date    LDL 38.00 (L) 01/15/2025    .00 (H) 08/22/2024    TRIG 96 01/15/2025    TRIG 237 (H) 08/22/2024    CREATININE 0.73 01/15/2025    MG 1.90 09/06/2023    K 3.5 01/15/2025        ASSESSMENT/PLAN:    Mild to Moderate Mitral Regurgitation  Mostly asymptomatic   Previously complained of leg swelling that is relieved with leg elevation- denies any symptoms today   We will continue to monitor with serial echoes every 1-2 years, unless symptoms  indicate otherwise   Will order Echo for patient to complete prior to next office visit     Hypertension   BP well controlled today 132/70  Continue current medications as listed above   Counseled on importance of following a low-sodium, heart healthy diet and exercise as tolerated    Hyperlipidemia   LDL 38, HDL 68, total cholesterol 125 per labs January 2025   She was currently taking fenofibrate   Continue Atorvastatin 20 mg daily and Zetia  Counseled on importance of following a low-cholesterol, low-fat diet and exercise as tolerated    Lower Extremity Edema  No recent complaints of edema  Continues to have muscle and nerve pain in legs- being treated per PCP  Venous US unremarkable   Counseled on compression stockings, leg elevation, and low-sodium diet       Echocardiogram   Follow up in cardiology clinic in 6 months or sooner if needed   Follow up with PCP as directed   Please notify clinic if any new concerns or any change in symptoms

## 2025-08-14 ENCOUNTER — HOSPITAL ENCOUNTER (OUTPATIENT)
Dept: CARDIOLOGY | Facility: HOSPITAL | Age: 70
Discharge: HOME | End: 2025-08-14
Payer: MEDICARE

## 2025-08-14 VITALS
SYSTOLIC BLOOD PRESSURE: 114 MMHG | HEIGHT: 62 IN | BODY MASS INDEX: 27.23 KG/M2 | WEIGHT: 148 LBS | DIASTOLIC BLOOD PRESSURE: 70 MMHG

## 2025-08-14 DIAGNOSIS — I34.0 MITRAL VALVE INSUFFICIENCY, UNSPECIFIED ETIOLOGY: ICD-10-CM

## 2025-08-14 LAB
APICAL FOUR CHAMBER EJECTION FRACTION: 75 %
APICAL TWO CHAMBER EJECTION FRACTION: 76 %
AV INDEX (PROSTH): 0.81
AV MEAN GRADIENT: 8 MMHG
AV PEAK GRADIENT: 14 MMHG
AV VALVE AREA BY VELOCITY RATIO: 2.1 CM²
AV VALVE AREA: 2.5 CM²
AV VELOCITY RATIO: 0.68
BSA FOR ECHO PROCEDURE: 1.71 M2
CV ECHO LV RWT: 0.47 CM
DOP CALC AO PEAK VEL: 1.9 M/S
DOP CALC AO VTI: 36.3 CM
DOP CALC LVOT AREA: 3.1 CM2
DOP CALC LVOT DIAMETER: 2 CM
DOP CALC LVOT PEAK VEL: 1.3 M/S
DOP CALC MV VTI: 25 CM
DOP CALCLVOT PEAK VEL VTI: 29.4 CM
E WAVE DECELERATION TIME: 228 MSEC
E/A RATIO: 1.04
E/E' RATIO: 11 M/S
ECHO LV POSTERIOR WALL: 1 CM (ref 0.6–1.1)
FRACTIONAL SHORTENING: 46.5 % (ref 28–44)
HR MV ECHO: 66 BPM
INTERVENTRICULAR SEPTUM: 0.8 CM (ref 0.6–1.1)
IVC DIAMETER: 1.8 CM
LEFT ATRIUM AREA SYSTOLIC (APICAL 2 CHAMBER): 13.57 CM2
LEFT ATRIUM AREA SYSTOLIC (APICAL 4 CHAMBER): 11.63 CM2
LEFT ATRIUM SIZE: 3.4 CM
LEFT ATRIUM VOLUME INDEX MOD: 15 ML/M2
LEFT ATRIUM VOLUME MOD: 25 ML
LEFT INTERNAL DIMENSION IN SYSTOLE: 2.3 CM (ref 2.1–4)
LEFT VENTRICLE DIASTOLIC VOLUME INDEX: 48.81 ML/M2
LEFT VENTRICLE DIASTOLIC VOLUME: 82 ML
LEFT VENTRICLE END DIASTOLIC VOLUME APICAL 2 CHAMBER: 55.17 ML
LEFT VENTRICLE END DIASTOLIC VOLUME APICAL 4 CHAMBER INDEX BSA: 33.82 ML/M2
LEFT VENTRICLE END DIASTOLIC VOLUME APICAL 4 CHAMBER: 56.82 ML
LEFT VENTRICLE END SYSTOLIC VOLUME APICAL 2 CHAMBER: 29.73 ML
LEFT VENTRICLE END SYSTOLIC VOLUME APICAL 4 CHAMBER: 21.66 ML
LEFT VENTRICLE MASS INDEX: 73.4 G/M2
LEFT VENTRICLE SYSTOLIC VOLUME INDEX: 10.7 ML/M2
LEFT VENTRICLE SYSTOLIC VOLUME: 18 ML
LEFT VENTRICULAR INTERNAL DIMENSION IN DIASTOLE: 4.3 CM (ref 3.5–6)
LEFT VENTRICULAR MASS: 123.3 G
LV LATERAL E/E' RATIO: 9.3 M/S
LV SEPTAL E/E' RATIO: 12 M/S
LVED V (TEICH): 82.16 ML
LVES V (TEICH): 17.73 ML
LVOT MG: 3.85 MMHG
LVOT MV: 0.93 CM/S
MV MEAN GRADIENT: 1 MMHG
MV PEAK A VEL: 0.81 M/S
MV PEAK E VEL: 0.84 M/S
MV PEAK GRADIENT: 4 MMHG
MV VALVE AREA BY CONTINUITY EQUATION: 3.69 CM2
OHS CV CPX PATIENT HEIGHT IN: 62
OHS LV EJECTION FRACTION SIMPSONS BIPLANE MOD: 76 %
PISA MRMAX VEL: 4.7 M/S
PISA TR MAX VEL: 2.3 M/S
RA MAJOR: 4.33 CM
RA PRESSURE ESTIMATED: 3 MMHG
RV TB RVSP: 5 MMHG
TDI LATERAL: 0.09 M/S
TDI SEPTAL: 0.07 M/S
TDI: 0.08 M/S
TR MAX PG: 21 MMHG
TRICUSPID ANNULAR PLANE SYSTOLIC EXCURSION: 2.4 CM
TV REST PULMONARY ARTERY PRESSURE: 24 MMHG
Z-SCORE OF LEFT VENTRICULAR DIMENSION IN END DIASTOLE: -0.86
Z-SCORE OF LEFT VENTRICULAR DIMENSION IN END SYSTOLE: -1.84

## 2025-08-14 PROCEDURE — 93306 TTE W/DOPPLER COMPLETE: CPT

## 2025-08-15 ENCOUNTER — TELEPHONE (OUTPATIENT)
Dept: INTERNAL MEDICINE | Facility: CLINIC | Age: 70
End: 2025-08-15
Payer: MEDICARE